# Patient Record
Sex: FEMALE | Race: BLACK OR AFRICAN AMERICAN | Employment: OTHER | URBAN - METROPOLITAN AREA
[De-identification: names, ages, dates, MRNs, and addresses within clinical notes are randomized per-mention and may not be internally consistent; named-entity substitution may affect disease eponyms.]

---

## 2018-04-06 ENCOUNTER — APPOINTMENT (OUTPATIENT)
Dept: CT IMAGING | Facility: CLINIC | Age: 63
DRG: 536 | End: 2018-04-06
Attending: EMERGENCY MEDICINE
Payer: COMMERCIAL

## 2018-04-06 ENCOUNTER — APPOINTMENT (OUTPATIENT)
Dept: GENERAL RADIOLOGY | Facility: CLINIC | Age: 63
DRG: 536 | End: 2018-04-06
Attending: EMERGENCY MEDICINE
Payer: COMMERCIAL

## 2018-04-06 ENCOUNTER — HOSPITAL ENCOUNTER (INPATIENT)
Facility: CLINIC | Age: 63
LOS: 3 days | Discharge: HOME HEALTH | DRG: 536 | End: 2018-04-10
Attending: EMERGENCY MEDICINE | Admitting: HOSPITALIST
Payer: COMMERCIAL

## 2018-04-06 DIAGNOSIS — R10.9 ABDOMINAL PAIN OF UNKNOWN ETIOLOGY: ICD-10-CM

## 2018-04-06 DIAGNOSIS — M54.9 INTRACTABLE BACK PAIN: ICD-10-CM

## 2018-04-06 DIAGNOSIS — S72.101A CLOSED FRACTURE OF TROCHANTER OF RIGHT FEMUR, INITIAL ENCOUNTER (H): Primary | ICD-10-CM

## 2018-04-06 DIAGNOSIS — M25.551 PAIN OF RIGHT HIP JOINT: ICD-10-CM

## 2018-04-06 DIAGNOSIS — M79.604 PAIN OF RIGHT LOWER EXTREMITY: ICD-10-CM

## 2018-04-06 DIAGNOSIS — R26.2 UNABLE TO AMBULATE: ICD-10-CM

## 2018-04-06 LAB
ANION GAP SERPL CALCULATED.3IONS-SCNC: 8 MMOL/L (ref 3–14)
BASOPHILS # BLD AUTO: 0 10E9/L (ref 0–0.2)
BASOPHILS NFR BLD AUTO: 0.2 %
BUN SERPL-MCNC: 22 MG/DL (ref 7–30)
CALCIUM SERPL-MCNC: 10.4 MG/DL (ref 8.5–10.1)
CHLORIDE SERPL-SCNC: 98 MMOL/L (ref 94–109)
CO2 SERPL-SCNC: 30 MMOL/L (ref 20–32)
CREAT SERPL-MCNC: 0.73 MG/DL (ref 0.52–1.04)
DIFFERENTIAL METHOD BLD: ABNORMAL
EOSINOPHIL # BLD AUTO: 0.1 10E9/L (ref 0–0.7)
EOSINOPHIL NFR BLD AUTO: 0.3 %
ERYTHROCYTE [DISTWIDTH] IN BLOOD BY AUTOMATED COUNT: 12.9 % (ref 10–15)
GFR SERPL CREATININE-BSD FRML MDRD: 80 ML/MIN/1.7M2
GLUCOSE SERPL-MCNC: 107 MG/DL (ref 70–99)
HCT VFR BLD AUTO: 37.2 % (ref 35–47)
HGB BLD-MCNC: 12.5 G/DL (ref 11.7–15.7)
IMM GRANULOCYTES # BLD: 0.1 10E9/L (ref 0–0.4)
IMM GRANULOCYTES NFR BLD: 0.7 %
LYMPHOCYTES # BLD AUTO: 1.8 10E9/L (ref 0.8–5.3)
LYMPHOCYTES NFR BLD AUTO: 10.5 %
MCH RBC QN AUTO: 31 PG (ref 26.5–33)
MCHC RBC AUTO-ENTMCNC: 33.6 G/DL (ref 31.5–36.5)
MCV RBC AUTO: 92 FL (ref 78–100)
MONOCYTES # BLD AUTO: 1.5 10E9/L (ref 0–1.3)
MONOCYTES NFR BLD AUTO: 9.1 %
NEUTROPHILS # BLD AUTO: 13.3 10E9/L (ref 1.6–8.3)
NEUTROPHILS NFR BLD AUTO: 79.2 %
NRBC # BLD AUTO: 0 10*3/UL
NRBC BLD AUTO-RTO: 0 /100
PLATELET # BLD AUTO: 338 10E9/L (ref 150–450)
POTASSIUM SERPL-SCNC: 3.3 MMOL/L (ref 3.4–5.3)
RBC # BLD AUTO: 4.03 10E12/L (ref 3.8–5.2)
SODIUM SERPL-SCNC: 136 MMOL/L (ref 133–144)
WBC # BLD AUTO: 16.8 10E9/L (ref 4–11)

## 2018-04-06 PROCEDURE — 85025 COMPLETE CBC W/AUTO DIFF WBC: CPT | Performed by: EMERGENCY MEDICINE

## 2018-04-06 PROCEDURE — 25000128 H RX IP 250 OP 636: Performed by: EMERGENCY MEDICINE

## 2018-04-06 PROCEDURE — 25000125 ZZHC RX 250: Performed by: EMERGENCY MEDICINE

## 2018-04-06 PROCEDURE — 99285 EMERGENCY DEPT VISIT HI MDM: CPT | Mod: 25

## 2018-04-06 PROCEDURE — 74177 CT ABD & PELVIS W/CONTRAST: CPT

## 2018-04-06 PROCEDURE — 72100 X-RAY EXAM L-S SPINE 2/3 VWS: CPT

## 2018-04-06 PROCEDURE — 96374 THER/PROPH/DIAG INJ IV PUSH: CPT | Mod: 59

## 2018-04-06 PROCEDURE — 73502 X-RAY EXAM HIP UNI 2-3 VIEWS: CPT

## 2018-04-06 PROCEDURE — 80048 BASIC METABOLIC PNL TOTAL CA: CPT | Performed by: EMERGENCY MEDICINE

## 2018-04-06 RX ORDER — MORPHINE SULFATE 4 MG/ML
4 INJECTION, SOLUTION INTRAMUSCULAR; INTRAVENOUS
Status: COMPLETED | OUTPATIENT
Start: 2018-04-06 | End: 2018-04-07

## 2018-04-06 RX ORDER — IOPAMIDOL 755 MG/ML
71 INJECTION, SOLUTION INTRAVASCULAR ONCE
Status: COMPLETED | OUTPATIENT
Start: 2018-04-06 | End: 2018-04-06

## 2018-04-06 RX ADMIN — MORPHINE SULFATE 4 MG: 4 INJECTION INTRAVENOUS at 22:31

## 2018-04-06 RX ADMIN — IOPAMIDOL 71 ML: 755 INJECTION, SOLUTION INTRAVENOUS at 23:45

## 2018-04-06 RX ADMIN — SODIUM CHLORIDE 61 ML: 9 INJECTION, SOLUTION INTRAVENOUS at 23:46

## 2018-04-06 ASSESSMENT — ENCOUNTER SYMPTOMS
UNEXPECTED WEIGHT CHANGE: 0
NUMBNESS: 1

## 2018-04-06 NOTE — IP AVS SNAPSHOT
[address] [facility]    [address] [address]    Phone:  [phone]                                       After Visit Summary   4/6/2018    Vamshi Murry    MRN: [id number]           After Visit Summary Signature Page     I have received my discharge instructions, and my questions have been answered. I have discussed any challenges I see with this plan with the nurse or doctor.    ..........................................................................................................................................  Patient/Patient Representative Signature      ..........................................................................................................................................  Patient Representative Print Name and Relationship to Patient    ..................................................               ................................................  Date                                            Time    ..........................................................................................................................................  Reviewed by Signature/Title    ...................................................              ..............................................  Date                                                            Time

## 2018-04-06 NOTE — IP AVS SNAPSHOT
MRN:[id number]                      After Visit Summary   4/6/2018    Vamshi Murry    MRN: [id number]           Thank you!     Thank you for choosing [facility] for your care. Our goal is always to provide you with excellent care. Hearing back from our patients is one way we can continue to improve our services. Please take a few minutes to complete the written survey that you may receive in the mail after you visit with us. Thank you!        Patient Information     Date Of Birth          1955        Designated Caregiver       Most Recent Value    Caregiver    Will someone help with your care after discharge? yes    Name of designated caregiver elijah zapata    Phone number of caregiver [phone]    Caregiver address [address] [address]      About your hospital stay     You were admitted on:  April 7, 2018 You last received care in the:  [place] [address] [facility]    You were discharged on:  April 10, 2018        Reason for your hospital stay       Right trochanteric fracture                  Who to Call     For medical emergencies, please call 911.  For non-urgent questions about your medical care, please call your primary care provider or clinic, [phone]          Attending Provider     Provider Specialty    Norma Castelan MD Emergency Medicine    [facility]    Kateryna Lopez MD Internal Medicine       Primary Care Provider Office Phone # Fax #    [facility] [phone]      After Care Instructions     Activity       Your activity upon discharge: activity as tolerated with walker.  Advance as recommended by PT            Diet       Follow this diet upon discharge: Orders Placed This Encounter      Combination Diet Regular Diet Adult; 2797-4526 Calories: Moderate Consistent CHO (4-6 CHO units/meal)                  Follow-up Appointments     Follow-up and recommended labs and tests         "Follow up with primary care provider, [facility], within 2 weeks, for hospital follow- up. No follow up labs or test are needed.    Should also follow-up with orthopedic surgery in 3-4 weeks                  Additional Services     Home Care OT Referral for Hospital Discharge       OT to eval and treat    Your provider has ordered home care - occupational therapy. If you have not been contacted within 2 days of your discharge please call the department phone number listed on the top of this document.            Home Care PT Referral for Hospital Discharge       PT to eval and treat    Your provider has ordered home care - physical therapy. If you have not been contacted within 2 days of your discharge please call the department phone number listed on the top of this document.                  Further instructions from your care team       Patient and son wish to consult with PCP before having HHC, they are aware that they will need to get HHC orders from Dr. Blunt, PCP.    Pending Results     Date and Time Order Name Status Description    4/7/2018 0510 Blood culture Preliminary     4/7/2018 0510 Blood culture Preliminary             Statement of Approval     Ordered          04/10/18 1005  I have reviewed and agree with all the recommendations and orders detailed in this document.  EFFECTIVE NOW     Approved and electronically signed by:  Steffen Walters,              Admission Information     Date & Time Provider Department Dept. Phone    4/6/2018 Kateryna Lopez MD [place] [address] [facility] [phone]      Your Vitals Were     Blood Pressure Pulse Temperature Respirations Height Weight    132/65 (BP Location: Left arm) 66 98.5  F (36.9  C) (Oral) 16 1.575 m (5' 2\") 59.2 kg (130 lb 8 oz)    Pulse Oximetry BMI (Body Mass Index)                94% 23.87 kg/m2          MyChart Information     [employer] lets you send messages to your doctor, view your test results, renew your " "prescriptions, schedule appointments and more. To sign up, go to [email or web address] . Click on \"Log in\" on the left side of the screen, which will take you to the Welcome page. Then click on \"Sign up Now\" on the right side of the page.     You will be asked to enter the access code listed below, as well as some personal information. Please follow the directions to create your username and password.     Your access code is: [email or web address]  Expires: 2018 11:56 AM     Your access code will  in 90 days. If you need help or a new code, please call your [facility] clinic or [phone].        Care EveryWhere ID     This is your Care EveryWhere ID. This could be used by other organizations to access your [facility] medical records  [id number]        Equal Access to Services     REESE BALDWIN : Bart Rushing, zeny garza, adolfo duque, madie houser . So [facility] [phone].    ATENCIÓN: Si habla español, tiene a veras disposición servicios gratuitos de asistencia lingüística. Niels al [phone].    We comply with applicable federal civil rights laws and [place] laws. We do not discriminate on the basis of race, color, national origin, age, disability, sex, sexual orientation, or gender identity.               Review of your medicines      START taking        Dose / Directions    order for DME   Used for:  Closed fracture of trochanter of right femur, initial encounter (H)        Equipment being ordered: Walker () Treatment Diagnosis: Trochanteric fracture   Quantity:  1 Device   Refills:  0       oxyCODONE-acetaminophen 5-325 MG per tablet   Commonly known as:  PERCOCET   Used for:  Closed fracture of trochanter of right femur, initial encounter (H)        Dose:  1 tablet   Take 1 tablet by mouth every 4 hours as needed for moderate to severe pain   Quantity:  14 tablet   Refills:  0       traMADol 100 MG 24 hr tablet   Commonly known as:  " ULTRAM-ER   Used for:  Closed fracture of trochanter of right femur, initial encounter (H)        Dose:  100 mg   Start taking on:  4/11/2018   Take 1 tablet (100 mg) by mouth daily   Quantity:  14 tablet   Refills:  0         CONTINUE these medicines which have NOT CHANGED        Dose / Directions    GABAPENTIN PO        Dose:  300 mg   Take 300 mg by mouth 2 times daily   Refills:  0       GLIMEPIRIDE PO        Dose:  2 mg   Take 2 mg by mouth daily (with breakfast)   Refills:  0       ketotifen 0.025 % Soln ophthalmic solution   Commonly known as:  ZADITOR/REFRESH ANTI-ITCH        Dose:  1 drop   Place 1 drop into both eyes daily as needed for itching   Refills:  0       LIPITOR PO        Dose:  40 mg   Take 40 mg by mouth At Bedtime   Refills:  0       losartan-hydrochlorothiazide 50-12.5 MG per tablet   Commonly known as:  HYZAAR        Dose:  1 tablet   Take 1 tablet by mouth daily   Refills:  0       METFORMIN HCL PO        Dose:  500 mg   Take 500 mg by mouth 2 times daily (with meals)   Refills:  0       MIRALAX PO        Dose:  17 g   Take 17 g by mouth daily as needed   Refills:  0       VITAMIN D (CHOLECALCIFEROL) PO        Dose:  2000 Units   Take 2,000 Units by mouth daily   Refills:  0       ZETIA PO        Dose:  10 mg   Take 10 mg by mouth At Bedtime   Refills:  0            Where to get your medicines      Some of these will need a paper prescription and others can be bought over the counter. Ask your nurse if you have questions.     Bring a paper prescription for each of these medications     order for DME    oxyCODONE-acetaminophen 5-325 MG per tablet    traMADol 100 MG 24 hr tablet                Protect others around you: Learn how to safely use, store and throw away your medicines at www.disposemymeds.org.        Information about OPIOIDS     PRESCRIPTION OPIOIDS: WHAT YOU NEED TO KNOW    Prescription opioids can be used to help relieve moderate to severe pain and are often prescribed  following a surgery or injury, or for certain health conditions. These medications can be an important part of treatment but also come with serious risks. It is important to work with your health care provider to make sure you are getting the safest, most effective care.    WHAT ARE THE RISKS AND SIDE EFFECTS OF OPIOID USE?  Prescription opioids carry serious risks of addiction and overdose, especially with prolonged use. An opioid overdose, often marked by slowed breathing can cause sudden death. The use of prescription opioids can have a number of side effects as well, even when taken as directed:      Tolerance - meaning you might need to take more of a medication for the same pain relief    Physical dependence - meaning you have symptoms of withdrawal when a medication is stopped    Increased sensitivity to pain    Constipation    Nausea, vomiting, and dry mouth    Sleepiness and dizziness    Confusion    Depression    Low levels of testosterone that can result in lower sex drive, energy, and strength    Itching and sweating    RISKS ARE GREATER WITH:    History of drug misuse, substance use disorder, or overdose    Mental health conditions (such as depression or anxiety)    Sleep apnea    Older age (65 years or older)    Pregnancy    Avoid alcohol while taking prescription opioids.   Also, unless specifically advised by your health care provider, medications to avoid include:    Benzodiazepines (such as Xanax or Valium)    Muscle relaxants (such as Soma or Flexeril)    Hypnotics (such as Ambien or Lunesta)    Other prescription opioids    KNOW YOUR OPTIONS:  Talk to your health care provider about ways to manage your pain that do not involve prescription opioids. Some of these options may actually work better and have fewer risks and side effects:    Pain relievers such as acetaminophen, ibuprofen, and naproxen    Some medications that are also used for depression or seizures    Physical therapy and  exercise    Cognitive behavioral therapy, a psychological, goal-directed approach, in which patients learn how to modify physical, behavioral, and emotional triggers of pain and stress    IF YOU ARE PRESCRIBED OPIOIDS FOR PAIN:    Never take opioids in greater amounts or more often than prescribed    Follow up with your primary health care provider and work together to create a plan on how to manage your pain.    Talk about ways to help manage your pain that do not involve prescription opioids    Talk about all concerns and side effects    Help prevent misuse and abuse    Never sell or share prescription opioids    Never use another person's prescription opioids    Store prescription opioids in a secure place and out of reach of others (this may include visitors, children, friends, and family)    Visit www.cdc.gov/drugoverdose to learn about risks of opioid abuse and overdose    If you believe you may be struggling with addiction, tell your health care provider and ask for guidance or call [facility]'s National Helpline at 3-288-311-HELP    LEARN MORE / www.cdc.gov/drugoverdose/prescribing/guideline.html    Safely dispose of unused prescription opioids: Find your local drug take-back programs and more information about the importance of safe disposal at www.doseofreality.[place].gov             Medication List: This is a list of all your medications and when to take them. Check marks below indicate your daily home schedule. Keep this list as a reference.      Medications           Morning Afternoon Evening Bedtime As Needed    GABAPENTIN PO   Take 300 mg by mouth 2 times daily   Last time this was given:  300 mg on 4/10/2018  8:23 AM   Next Dose Due:  04/10/18                                   GLIMEPIRIDE PO   Take 2 mg by mouth daily (with breakfast)   Next Dose Due:  Resume as ordered                                ketotifen 0.025 % Soln ophthalmic solution   Commonly known as:  ZADITOR/REFRESH ANTI-ITCH   Place 1 drop  into both eyes daily as needed for itching   Next Dose Due:  Resume as ordered                                 LIPITOR PO   Take 40 mg by mouth At Bedtime   Last time this was given:  40 mg on 4/9/2018 10:55 PM   Next Dose Due:  04/10/18                                   losartan-hydrochlorothiazide 50-12.5 MG per tablet   Commonly known as:  HYZAAR   Take 1 tablet by mouth daily   Last time this was given:  1 tablet on 4/10/2018  8:23 AM                                   METFORMIN HCL PO   Take 500 mg by mouth 2 times daily (with meals)   Next Dose Due:  Resume as ordered                                MIRALAX PO   Take 17 g by mouth daily as needed   Last time this was given:  17 g on 4/7/2018  5:12 PM   Next Dose Due:  As needed any time                                   order for DME   Equipment being ordered: Walker () Treatment Diagnosis: Trochanteric fracture                                oxyCODONE-acetaminophen 5-325 MG per tablet   Commonly known as:  PERCOCET   Take 1 tablet by mouth every 4 hours as needed for moderate to severe pain   Last time this was given:  1 tablet on 4/10/2018 10:04 AM   Next Dose Due:  As needed after 2 pm                                   traMADol 100 MG 24 hr tablet   Commonly known as:  ULTRAM-ER   Take 1 tablet (100 mg) by mouth daily   Start taking on:  4/11/2018   Last time this was given:  100 mg on 4/10/2018  8:23 AM   Next Dose Due:  04/11/18                                   VITAMIN D (CHOLECALCIFEROL) PO   Take 2,000 Units by mouth daily   Last time this was given:  2,000 Units on 4/10/2018  8:23 AM   Next Dose Due:  04/11/18                                   ZETIA PO   Take 10 mg by mouth At Bedtime   Last time this was given:  10 mg on 4/9/2018 10:56 PM   Next Dose Due:  04/10/18

## 2018-04-07 ENCOUNTER — APPOINTMENT (OUTPATIENT)
Dept: GENERAL RADIOLOGY | Facility: CLINIC | Age: 63
DRG: 536 | End: 2018-04-07
Attending: PHYSICIAN ASSISTANT
Payer: COMMERCIAL

## 2018-04-07 ENCOUNTER — APPOINTMENT (OUTPATIENT)
Dept: CT IMAGING | Facility: CLINIC | Age: 63
DRG: 536 | End: 2018-04-07
Attending: PHYSICIAN ASSISTANT
Payer: COMMERCIAL

## 2018-04-07 ENCOUNTER — APPOINTMENT (OUTPATIENT)
Dept: MRI IMAGING | Facility: CLINIC | Age: 63
DRG: 536 | End: 2018-04-07
Attending: PHYSICIAN ASSISTANT
Payer: COMMERCIAL

## 2018-04-07 ENCOUNTER — APPOINTMENT (OUTPATIENT)
Dept: ULTRASOUND IMAGING | Facility: CLINIC | Age: 63
DRG: 536 | End: 2018-04-07
Attending: PHYSICIAN ASSISTANT
Payer: COMMERCIAL

## 2018-04-07 PROBLEM — M54.9 BACK PAIN: Status: ACTIVE | Noted: 2018-04-07

## 2018-04-07 PROBLEM — M79.606 LEG PAIN: Status: ACTIVE | Noted: 2018-04-07

## 2018-04-07 LAB
ALBUMIN SERPL-MCNC: 3.5 G/DL (ref 3.4–5)
ALBUMIN UR-MCNC: NEGATIVE MG/DL
ALP SERPL-CCNC: 45 U/L (ref 40–150)
ALT SERPL W P-5'-P-CCNC: 16 U/L (ref 0–50)
ANION GAP SERPL CALCULATED.3IONS-SCNC: 6 MMOL/L (ref 3–14)
APPEARANCE UR: CLEAR
AST SERPL W P-5'-P-CCNC: 15 U/L (ref 0–45)
BASOPHILS # BLD AUTO: 0.1 10E9/L (ref 0–0.2)
BASOPHILS NFR BLD AUTO: 0.3 %
BILIRUB DIRECT SERPL-MCNC: 0.2 MG/DL (ref 0–0.2)
BILIRUB SERPL-MCNC: 1 MG/DL (ref 0.2–1.3)
BILIRUB UR QL STRIP: NEGATIVE
BUN SERPL-MCNC: 19 MG/DL (ref 7–30)
CA-I SERPL ISE-MCNC: 5.2 MG/DL (ref 4.4–5.2)
CALCIUM SERPL-MCNC: 9.7 MG/DL (ref 8.5–10.1)
CHLORIDE SERPL-SCNC: 98 MMOL/L (ref 94–109)
CO2 SERPL-SCNC: 28 MMOL/L (ref 20–32)
COLOR UR AUTO: ABNORMAL
CREAT SERPL-MCNC: 0.78 MG/DL (ref 0.52–1.04)
CRP SERPL-MCNC: 39.6 MG/L (ref 0–8)
DIFFERENTIAL METHOD BLD: ABNORMAL
EOSINOPHIL # BLD AUTO: 0 10E9/L (ref 0–0.7)
EOSINOPHIL NFR BLD AUTO: 0.1 %
ERYTHROCYTE [DISTWIDTH] IN BLOOD BY AUTOMATED COUNT: 13 % (ref 10–15)
ERYTHROCYTE [SEDIMENTATION RATE] IN BLOOD BY WESTERGREN METHOD: 16 MM/H (ref 0–30)
FLUAV+FLUBV RNA SPEC QL NAA+PROBE: NEGATIVE
FLUAV+FLUBV RNA SPEC QL NAA+PROBE: NEGATIVE
GFR SERPL CREATININE-BSD FRML MDRD: 75 ML/MIN/1.7M2
GLUCOSE BLDC GLUCOMTR-MCNC: 165 MG/DL (ref 70–99)
GLUCOSE BLDC GLUCOMTR-MCNC: 165 MG/DL (ref 70–99)
GLUCOSE BLDC GLUCOMTR-MCNC: 182 MG/DL (ref 70–99)
GLUCOSE BLDC GLUCOMTR-MCNC: 194 MG/DL (ref 70–99)
GLUCOSE BLDC GLUCOMTR-MCNC: 245 MG/DL (ref 70–99)
GLUCOSE SERPL-MCNC: 219 MG/DL (ref 70–99)
GLUCOSE UR STRIP-MCNC: NEGATIVE MG/DL
HBA1C MFR BLD: 7.1 % (ref 0–6.4)
HCT VFR BLD AUTO: 34.1 % (ref 35–47)
HGB BLD-MCNC: 11.3 G/DL (ref 11.7–15.7)
HGB UR QL STRIP: ABNORMAL
IMM GRANULOCYTES # BLD: 0 10E9/L (ref 0–0.4)
IMM GRANULOCYTES NFR BLD: 0.3 %
KETONES UR STRIP-MCNC: NEGATIVE MG/DL
LEUKOCYTE ESTERASE UR QL STRIP: ABNORMAL
LYMPHOCYTES # BLD AUTO: 2.4 10E9/L (ref 0.8–5.3)
LYMPHOCYTES NFR BLD AUTO: 15.9 %
MCH RBC QN AUTO: 30.5 PG (ref 26.5–33)
MCHC RBC AUTO-ENTMCNC: 33.1 G/DL (ref 31.5–36.5)
MCV RBC AUTO: 92 FL (ref 78–100)
MONOCYTES # BLD AUTO: 1.7 10E9/L (ref 0–1.3)
MONOCYTES NFR BLD AUTO: 11.1 %
NEUTROPHILS # BLD AUTO: 11.1 10E9/L (ref 1.6–8.3)
NEUTROPHILS NFR BLD AUTO: 72.3 %
NITRATE UR QL: NEGATIVE
NRBC # BLD AUTO: 0 10*3/UL
NRBC BLD AUTO-RTO: 0 /100
PH UR STRIP: 5 PH (ref 5–7)
PLATELET # BLD AUTO: 308 10E9/L (ref 150–450)
POTASSIUM SERPL-SCNC: 3.9 MMOL/L (ref 3.4–5.3)
PROCALCITONIN SERPL-MCNC: 0.18 NG/ML
PROT SERPL-MCNC: 6.7 G/DL (ref 6.8–8.8)
RADIOLOGIST FLAGS: ABNORMAL
RBC # BLD AUTO: 3.7 10E12/L (ref 3.8–5.2)
RBC #/AREA URNS AUTO: 1 /HPF (ref 0–2)
RSV RNA SPEC NAA+PROBE: NEGATIVE
SODIUM SERPL-SCNC: 132 MMOL/L (ref 133–144)
SOURCE: ABNORMAL
SP GR UR STRIP: 1.02 (ref 1–1.03)
SPECIMEN SOURCE: NORMAL
SQUAMOUS #/AREA URNS AUTO: 3 /HPF (ref 0–1)
UROBILINOGEN UR STRIP-MCNC: NORMAL MG/DL (ref 0–2)
WBC # BLD AUTO: 15.4 10E9/L (ref 4–11)
WBC #/AREA URNS AUTO: 4 /HPF (ref 0–5)

## 2018-04-07 PROCEDURE — 81001 URINALYSIS AUTO W/SCOPE: CPT | Performed by: EMERGENCY MEDICINE

## 2018-04-07 PROCEDURE — 25000132 ZZH RX MED GY IP 250 OP 250 PS 637: Performed by: INTERNAL MEDICINE

## 2018-04-07 PROCEDURE — 73720 MRI LWR EXTREMITY W/O&W/DYE: CPT | Mod: RT

## 2018-04-07 PROCEDURE — 87040 BLOOD CULTURE FOR BACTERIA: CPT | Performed by: HOSPITALIST

## 2018-04-07 PROCEDURE — 00000146 ZZHCL STATISTIC GLUCOSE BY METER IP

## 2018-04-07 PROCEDURE — 96376 TX/PRO/DX INJ SAME DRUG ADON: CPT

## 2018-04-07 PROCEDURE — G0378 HOSPITAL OBSERVATION PER HR: HCPCS

## 2018-04-07 PROCEDURE — 25000128 H RX IP 250 OP 636: Performed by: HOSPITALIST

## 2018-04-07 PROCEDURE — A9585 GADOBUTROL INJECTION: HCPCS | Performed by: HOSPITALIST

## 2018-04-07 PROCEDURE — 82330 ASSAY OF CALCIUM: CPT | Performed by: HOSPITALIST

## 2018-04-07 PROCEDURE — 93970 EXTREMITY STUDY: CPT | Mod: XS

## 2018-04-07 PROCEDURE — 71046 X-RAY EXAM CHEST 2 VIEWS: CPT

## 2018-04-07 PROCEDURE — 85025 COMPLETE CBC W/AUTO DIFF WBC: CPT | Performed by: HOSPITALIST

## 2018-04-07 PROCEDURE — 36415 COLL VENOUS BLD VENIPUNCTURE: CPT | Performed by: PHYSICIAN ASSISTANT

## 2018-04-07 PROCEDURE — 99223 1ST HOSP IP/OBS HIGH 75: CPT | Mod: AI | Performed by: HOSPITALIST

## 2018-04-07 PROCEDURE — 85652 RBC SED RATE AUTOMATED: CPT | Performed by: PHYSICIAN ASSISTANT

## 2018-04-07 PROCEDURE — 83036 HEMOGLOBIN GLYCOSYLATED A1C: CPT | Performed by: HOSPITALIST

## 2018-04-07 PROCEDURE — 80076 HEPATIC FUNCTION PANEL: CPT | Performed by: HOSPITALIST

## 2018-04-07 PROCEDURE — 25000128 H RX IP 250 OP 636: Performed by: EMERGENCY MEDICINE

## 2018-04-07 PROCEDURE — 86140 C-REACTIVE PROTEIN: CPT | Performed by: PHYSICIAN ASSISTANT

## 2018-04-07 PROCEDURE — 12000007 ZZH R&B INTERMEDIATE

## 2018-04-07 PROCEDURE — 87631 RESP VIRUS 3-5 TARGETS: CPT | Performed by: HOSPITALIST

## 2018-04-07 PROCEDURE — 25000131 ZZH RX MED GY IP 250 OP 636 PS 637: Performed by: HOSPITALIST

## 2018-04-07 PROCEDURE — 87086 URINE CULTURE/COLONY COUNT: CPT | Performed by: EMERGENCY MEDICINE

## 2018-04-07 PROCEDURE — 71250 CT THORAX DX C-: CPT

## 2018-04-07 PROCEDURE — 25000132 ZZH RX MED GY IP 250 OP 250 PS 637: Performed by: HOSPITALIST

## 2018-04-07 PROCEDURE — 80048 BASIC METABOLIC PNL TOTAL CA: CPT | Performed by: PHYSICIAN ASSISTANT

## 2018-04-07 PROCEDURE — 36415 COLL VENOUS BLD VENIPUNCTURE: CPT | Performed by: HOSPITALIST

## 2018-04-07 PROCEDURE — 84145 PROCALCITONIN (PCT): CPT | Performed by: PHYSICIAN ASSISTANT

## 2018-04-07 PROCEDURE — 93970 EXTREMITY STUDY: CPT

## 2018-04-07 PROCEDURE — 96372 THER/PROPH/DIAG INJ SC/IM: CPT

## 2018-04-07 RX ORDER — ACETAMINOPHEN 650 MG/1
650 SUPPOSITORY RECTAL EVERY 4 HOURS PRN
Status: DISCONTINUED | OUTPATIENT
Start: 2018-04-07 | End: 2018-04-10 | Stop reason: HOSPADM

## 2018-04-07 RX ORDER — NICOTINE POLACRILEX 4 MG
15-30 LOZENGE BUCCAL
Status: DISCONTINUED | OUTPATIENT
Start: 2018-04-07 | End: 2018-04-10 | Stop reason: HOSPADM

## 2018-04-07 RX ORDER — NALOXONE HYDROCHLORIDE 0.4 MG/ML
.1-.4 INJECTION, SOLUTION INTRAMUSCULAR; INTRAVENOUS; SUBCUTANEOUS
Status: DISCONTINUED | OUTPATIENT
Start: 2018-04-07 | End: 2018-04-10 | Stop reason: HOSPADM

## 2018-04-07 RX ORDER — SODIUM CHLORIDE 9 MG/ML
INJECTION, SOLUTION INTRAVENOUS CONTINUOUS
Status: DISCONTINUED | OUTPATIENT
Start: 2018-04-07 | End: 2018-04-08

## 2018-04-07 RX ORDER — AMOXICILLIN 250 MG
2 CAPSULE ORAL 2 TIMES DAILY PRN
Status: DISCONTINUED | OUTPATIENT
Start: 2018-04-07 | End: 2018-04-10 | Stop reason: HOSPADM

## 2018-04-07 RX ORDER — GADOBUTROL 604.72 MG/ML
6 INJECTION INTRAVENOUS ONCE
Status: COMPLETED | OUTPATIENT
Start: 2018-04-07 | End: 2018-04-07

## 2018-04-07 RX ORDER — POLYETHYLENE GLYCOL 3350 17 G/17G
17 POWDER, FOR SOLUTION ORAL DAILY PRN
Status: DISCONTINUED | OUTPATIENT
Start: 2018-04-07 | End: 2018-04-07

## 2018-04-07 RX ORDER — POLYETHYLENE GLYCOL 3350 17 G/17G
17 POWDER, FOR SOLUTION ORAL ONCE
Status: COMPLETED | OUTPATIENT
Start: 2018-04-07 | End: 2018-04-07

## 2018-04-07 RX ORDER — HYDRALAZINE HYDROCHLORIDE 20 MG/ML
10-20 INJECTION INTRAMUSCULAR; INTRAVENOUS EVERY 4 HOURS PRN
Status: DISCONTINUED | OUTPATIENT
Start: 2018-04-07 | End: 2018-04-10 | Stop reason: HOSPADM

## 2018-04-07 RX ORDER — ONDANSETRON 4 MG/1
4 TABLET, ORALLY DISINTEGRATING ORAL EVERY 6 HOURS PRN
Status: DISCONTINUED | OUTPATIENT
Start: 2018-04-07 | End: 2018-04-10 | Stop reason: HOSPADM

## 2018-04-07 RX ORDER — DEXTROSE MONOHYDRATE 25 G/50ML
25-50 INJECTION, SOLUTION INTRAVENOUS
Status: DISCONTINUED | OUTPATIENT
Start: 2018-04-07 | End: 2018-04-10 | Stop reason: HOSPADM

## 2018-04-07 RX ORDER — ACETAMINOPHEN 325 MG/1
650 TABLET ORAL EVERY 4 HOURS PRN
Status: DISCONTINUED | OUTPATIENT
Start: 2018-04-07 | End: 2018-04-10 | Stop reason: HOSPADM

## 2018-04-07 RX ORDER — LOSARTAN POTASSIUM AND HYDROCHLOROTHIAZIDE 12.5; 5 MG/1; MG/1
1 TABLET ORAL DAILY
COMMUNITY

## 2018-04-07 RX ORDER — ONDANSETRON 2 MG/ML
4 INJECTION INTRAMUSCULAR; INTRAVENOUS EVERY 6 HOURS PRN
Status: DISCONTINUED | OUTPATIENT
Start: 2018-04-07 | End: 2018-04-10 | Stop reason: HOSPADM

## 2018-04-07 RX ORDER — LIDOCAINE 40 MG/G
CREAM TOPICAL
Status: DISCONTINUED | OUTPATIENT
Start: 2018-04-07 | End: 2018-04-10 | Stop reason: HOSPADM

## 2018-04-07 RX ORDER — LANOLIN ALCOHOL/MO/W.PET/CERES
3 CREAM (GRAM) TOPICAL
Status: DISCONTINUED | OUTPATIENT
Start: 2018-04-07 | End: 2018-04-10 | Stop reason: HOSPADM

## 2018-04-07 RX ORDER — BISACODYL 10 MG
10 SUPPOSITORY, RECTAL RECTAL DAILY PRN
Status: DISCONTINUED | OUTPATIENT
Start: 2018-04-07 | End: 2018-04-09

## 2018-04-07 RX ORDER — POTASSIUM CHLORIDE 1.5 G/1.58G
20 POWDER, FOR SOLUTION ORAL ONCE
Status: COMPLETED | OUTPATIENT
Start: 2018-04-07 | End: 2018-04-07

## 2018-04-07 RX ORDER — AMOXICILLIN 250 MG
1 CAPSULE ORAL 2 TIMES DAILY PRN
Status: DISCONTINUED | OUTPATIENT
Start: 2018-04-07 | End: 2018-04-10 | Stop reason: HOSPADM

## 2018-04-07 RX ORDER — HYDROMORPHONE HYDROCHLORIDE 1 MG/ML
.2-.3 INJECTION, SOLUTION INTRAMUSCULAR; INTRAVENOUS; SUBCUTANEOUS
Status: DISCONTINUED | OUTPATIENT
Start: 2018-04-07 | End: 2018-04-09

## 2018-04-07 RX ORDER — OXYCODONE HYDROCHLORIDE 5 MG/1
5-10 TABLET ORAL
Status: DISCONTINUED | OUTPATIENT
Start: 2018-04-07 | End: 2018-04-08

## 2018-04-07 RX ADMIN — SODIUM CHLORIDE: 9 INJECTION, SOLUTION INTRAVENOUS at 18:01

## 2018-04-07 RX ADMIN — INSULIN ASPART 1 UNITS: 100 INJECTION, SOLUTION INTRAVENOUS; SUBCUTANEOUS at 10:25

## 2018-04-07 RX ADMIN — OXYCODONE HYDROCHLORIDE 10 MG: 5 TABLET ORAL at 10:36

## 2018-04-07 RX ADMIN — ACETAMINOPHEN 650 MG: 325 TABLET, FILM COATED ORAL at 17:49

## 2018-04-07 RX ADMIN — ACETAMINOPHEN 650 MG: 325 TABLET, FILM COATED ORAL at 08:37

## 2018-04-07 RX ADMIN — OXYCODONE HYDROCHLORIDE 5 MG: 5 TABLET ORAL at 05:54

## 2018-04-07 RX ADMIN — OXYCODONE HYDROCHLORIDE 10 MG: 5 TABLET ORAL at 21:21

## 2018-04-07 RX ADMIN — MORPHINE SULFATE 4 MG: 4 INJECTION INTRAVENOUS at 02:37

## 2018-04-07 RX ADMIN — OXYCODONE HYDROCHLORIDE 10 MG: 5 TABLET ORAL at 17:49

## 2018-04-07 RX ADMIN — POTASSIUM CHLORIDE 20 MEQ: 1.5 POWDER, FOR SOLUTION ORAL at 05:55

## 2018-04-07 RX ADMIN — INSULIN ASPART 2 UNITS: 100 INJECTION, SOLUTION INTRAVENOUS; SUBCUTANEOUS at 14:30

## 2018-04-07 RX ADMIN — SODIUM CHLORIDE: 9 INJECTION, SOLUTION INTRAVENOUS at 05:55

## 2018-04-07 RX ADMIN — OXYCODONE HYDROCHLORIDE 10 MG: 5 TABLET ORAL at 15:00

## 2018-04-07 RX ADMIN — MORPHINE SULFATE 4 MG: 4 INJECTION INTRAVENOUS at 00:37

## 2018-04-07 RX ADMIN — POLYETHYLENE GLYCOL 3350 17 G: 17 POWDER, FOR SOLUTION ORAL at 17:12

## 2018-04-07 RX ADMIN — INSULIN ASPART 1 UNITS: 100 INJECTION, SOLUTION INTRAVENOUS; SUBCUTANEOUS at 17:50

## 2018-04-07 RX ADMIN — GADOBUTROL 6 ML: 604.72 INJECTION INTRAVENOUS at 12:49

## 2018-04-07 NOTE — ED NOTES
Bed: ED04  Expected date:   Expected time:   Means of arrival:   Comments:   63f abd pain right lower extremity pain. [place] eta 4180

## 2018-04-07 NOTE — ED PROVIDER NOTES
History     Chief Complaint:  Leg Pain    The history is provided by the patient and a relative. The history is limited by a language barrier. No  was used (The patient's daughter and son served as her .).      Vamshi Muryr is a 63 year old female who presents to the emergency department today for evaluation of leg pain. The patient has been experiencing constant right hip pain that radiates down her right leg for the past 3 days. The pain is worse with movement and with palpation. This morning, she had difficulty walking due to the hip and leg pain and had lost her balance while walking to the restroom. Her son was able to catch her so she did not suffer a traumatic fall. She has numbness around her right knee and occasional urinary incontinence, but no recent weight loss or concerning new symptoms. She complains of occasional low back pain and intermittent abdominal pain, but states these symptoms have been present for a long time. Today, her main concern is her right hip and right leg pain. She reports no recent injuries to her hip or leg.     Allergies:  Drug allergies reviewed. No pertinent drug allergies.     Medications:    GABAPENTIN PO  VITAMIN D, CHOLECALCIFEROL, PO  METFORMIN HCL PO  GLIMEPIRIDE PO  losartan (COZAAR) 2.5 mg/mL SUSP  SIMVASTATIN PO    Past Medical History:    Diabetes   Hypertension     Past Surgical History:    History reviewed. No pertinent surgical history.    Family History:    Family history reviewed. No pertinent family history.     Social History:  The patient was accompanied to the ED by family.  Smoking Status: Never Smoker  Smokeless Tobacco: Never Used  Alcohol Use: Negative     Review of Systems   Constitutional: Negative for unexpected weight change.   Musculoskeletal:        Right hip and right leg pain   Neurological: Positive for numbness.   All other systems reviewed and are negative.    Physical Exam     Patient Vitals for the past 24  "hrs:   BP Temp Temp src Heart Rate Resp SpO2 Height Weight   04/07/18 0245 - - - - - 94 % - -   04/07/18 0240 - - - - - 96 % - -   04/06/18 2330 134/55 - - - - 97 % - -   04/06/18 2309 - - - - - 95 % - -   04/06/18 2308 141/66 - - - - - - -   04/06/18 2235 - - - - - 95 % - -   04/06/18 2202 (!) 127/105 98.3  F (36.8  C) Oral 78 20 98 % 1.575 m (5' 2\") 63.5 kg (140 lb)      Physical Exam  General: Well-nourished  Eyes: PERRL, conjunctivae pink no scleral icterus or conjunctival injection  ENT:  Moist mucus membranes, posterior oropharynx clear without erythema or exudates  Respiratory:  Lungs clear to auscultation bilaterally, no crackles/rubs/wheezes.  Good air movement  CV: Normal rate and rhythm, no murmurs/rubs/gallops  GI:  Abdomen soft and non-distended.  Normoactive BS.  Diffuse lower abdominal tenderness, no guarding or rebound  Skin: Warm, dry.  No rashes or petechiae  Musculoskeletal: No peripheral edema or calf tenderness.  Normal symmetric DP pulse, cap refill, temp, color distal foot.  Tender over right hip and femur, pain with movement at hip.  No appreciable effusion at hip/knee/ankle.  No redness or warmth.  No zoster rash.  +tender over right sciatic notch and diffusely across midline lumbar spine.    Neuro: Alert and oriented to person/place/time.  Pain with movement at right hip but able to actively range at hip/knee/ankle. Normal saddle sensation.  Normal and symmetric reflexes at patella tendon bilaterally.  Normal and symmetric strength at BLE.  Psychiatric: Normal affect    Emergency Department Course     Imaging:  Radiology findings were communicated with the patient and family who voiced understanding of the findings.    CT Abdomen Pelvis w Contrast  IMPRESSION:  1. No cause of acute pain identified in the abdomen or pelvis.  2. Colonic diverticulosis without evidence of diverticulitis.  Reading per radiology     XR Lumbar Spine 2/3 Views  IMPRESSION:  1. No visualized acute fracture, " malalignment or other acute osseous  abnormality of the lumbar spine.  2. Mild to moderate degenerative changes within the lumbar spine.  3. Atherosclerotic calcification in the abdominal aorta.  Reading per radiology     XR Pelvis w Hip Right 1 View  IMPRESSION:  1. No visualized acute fracture, malalignment or other acute osseous  abnormality of the pelvis or right hip.  2. Diffuse osteopenia.  Reading per radiology     Laboratory:  Laboratory findings were communicated with the patient and family who voiced understanding of the findings.    UA with Microscopic: Blood Trace (A), Leukocyte Esterase Moderate (A), Squamous Epithelial/HPF 3 (H) o/w WNL  Urine culture aerobic bacterial: Pending   CBC: WBC 16.8 (H), HGB 12.5,   BMP: Potassium 3.3 (L), Glucose (Collected 2033) 107 (H), Calcium 10.4 (H) o/w WNL (Creatinine 0.73)    Interventions:  2231 Morphine 4 mg IV   0037 Morphine 4 mg IV   0237 Morphine 4 mg IV     Emergency Department Course:    Nursing notes and vitals reviewed.    2207 I performed an exam of the patient as documented above.     IV was inserted and blood was drawn for laboratory testing, results above.      The patient was sent for a CT abdomen pelvis w contrast, x-ray pelvis w right hip, x-ray lumbar spine while in the emergency department, results above.       The patient provided a urine sample here in the emergency department. This was sent for laboratory testing, findings above.     2309 I updated the patient and family.    1320 I reassessed the patient.    0330 I discussed the patient with Dr. Hogan, who will admit the patient to a monitored bed for further evaluation and treatment.     I personally reviewed the lab and imaging results with the patient and answered all related questions prior to admission. I discussed the treatment plan with the patient. They expressed understanding of this plan and consented to admission.    Impression & Plan      Medical Decision Making:  Vamshi  Masood Murry is a 63 year old female who presents to the emergency department today for evaluation of right hip and leg pain as well as lower abdominal pain. Per the records, she has a history of chronic pelvic pain, but given elevated white blood cell count and her age, CT scan was obtained to rule out an acute intraabdominal process. CT scan was negative. She does not appear to have any evidence of a definite urinary tract infection and denied urinary symptoms.  X-rays of her lumbar spine, hip, and pelvis were obtained and are normal. Records do show she has a history of chronic pain that is typically on the left side. This right sided pain seems to be new to her. She does have a history of hyperparathyroidism and calcium is slightly elevated.  Pain may be related to this.  She is able to move it although she is unable to bear weight secondary to the pain. The pain was improved with parenteral narcotics, but she required 3 doses of morphine, and even after this she was unable to bear weight or ambulate on it with a walker. Given the possibility of occult fracture and the need for PT/OT and ongoing pain control, we will admit her to the hospitalist service on the observation unit. Dr. Hogan was gracious enough to accept her to his service and admit her. The family was in agreement with this plan.     Diagnosis:    ICD-10-CM    1. Intractable back pain M54.9 Urine Culture Aerobic Bacterial   2. Pain of right hip joint M25.551    3. Pain of right lower extremity M79.604    4. Abdominal pain of unknown etiology R10.9    5. Unable to ambulate R26.2      Disposition:   The patient is admitted into the care of Dr. Hogan.     Scribe Disclosure:  Autumn JOHNSON, am serving as a scribe at 10:11 PM on 4/6/2018 to document services personally performed by Norma Castelan MD, based on my observations and the provider's statements to me.       EMERGENCY DEPARTMENT       Norma Castelan MD  04/07/18 0712       Norma Castelan,  MD  04/07/18 0714

## 2018-04-07 NOTE — PLAN OF CARE
Problem: Patient Care Overview  Goal: Plan of Care/Patient Progress Review  PRIMARY DIAGNOSIS: ACUTE PAIN  OUTPATIENT/OBSERVATION GOALS TO BE MET BEFORE DISCHARGE:  1. Pain Status: Improved-controlled with oral pain medications.    2. Return to near baseline physical activity: No    3. Cleared for discharge by consultants (if involved): No; Pt. Currently off of unit for chest CT and MRI.    Discharge Planner Nurse   Safe discharge environment identified: Yes  Barriers to discharge: Yes; pt. Yet to meet OBS goals       Entered by: Hayley Jimenez 04/07/2018 12:39 PM     Please review provider order for any additional goals.   Nurse to notify provider when observation goals have been met and patient is ready for discharge.

## 2018-04-07 NOTE — ED NOTES
[facility]  ED Nurse Handoff Report    ED Chief complaint: Leg Pain (Started yesterday, mostly in right hip but radiates down her whole leg. This morning, may have caused her to lose her balance and fall- son was there to catch her and prevent tramatic falll. Unable to ambulate)      ED Diagnosis:   Final diagnoses:   Intractable back pain   Pain of right hip joint   Pain of right lower extremity   Abdominal pain of unknown etiology   Unable to ambulate       Code Status: Not on file    Allergies: No Known Allergies    Activity level - Baseline/Home:  Independent    Activity Level - Current:   Stand with Assist     Needed?: Yes; pt has consented to translate for patient, but if family is not present will need     Isolation: No  Infection: Not Applicable    Bariatric?: No    Vital Signs:   Vitals:    04/06/18 2308 04/06/18 2309 04/06/18 2330 04/07/18 0240   BP: 141/66  134/55    Resp:       Temp:       TempSrc:       SpO2:  95% 97% 96%   Weight:       Height:           Cardiac Rhythm: ,        Pain level: 0-10 Pain Scale: 9    Is this patient confused?: No     Patient Report: Initial Complaint: Unable to ambulate, normally independent, today leg pain from hip to knee that causes pain.   Focused Assessment: Pain rated 9/10, unable to walk during road tests. Morphine given for pain, bed pan for comfort but patient could tolerate bedside commode with assist.   Tests Performed: Xray, CT, labs  Abnormal Results: see results tab  Treatments provided: Pain medication, road test    Family Comments: family present     OBS brochure/video discussed/provided to patient: Yes    ED Medications:   Medications   morphine (PF) injection 4 mg (4 mg Intravenous Given 4/7/18 3897)   iopamidol (ISOVUE-370) solution 71 mL (71 mLs Intravenous Given 4/6/18 3061)   Saline flush (61 mLs Intravenous Given 4/6/18 2096)       Drips infusing?:  No    For the majority of the shift this patient was Green.    Interventions performed were n/a.    Severe Sepsis OR Septic Shock Diagnosis Present: No      ED NURSE PHONE NUMBER: [phone]       RECEIVING UNIT ED HANDOFF REVIEW    ED Nurse Handoff Report was reviewed by: Shivani Rodriguez on April 7, 2018 at 4:03 AM

## 2018-04-07 NOTE — PROGRESS NOTES
"[facility]  Hospitalist Progress Note  Admission Date:  4/6/2018       Assessment and Plan:   Vamshi Murry is a pleasant 63 year old woman with Type 2 Diabetes mellitus, hypertension, dyslipidemia, hyperparathyroid, and fibromyalgia who presents with progressive right leg pain and fever.    Noted her case is difficult given language barrier (somonlian speaking and declines interpretor) and incongruent care (per Care Everywhere she goes to [facility], , [facility], etc).  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     Also, there is mention in care Everywhere of \"lytic bone lesion in right femur, noted incidentally in 2014\". No further records on this available, no imaging in Care Everywhere. Reportedly, she just had an MRI of her spine as an outpatient that is not in Care Everywhere.    #Progressive RLE Pain  #Prior mention of lytic right femur lesion?  #Inability to ambulate   #Falls (one a month ago, one yesterday morning)   -concern for underlying malignancy or occult infection vs occult pelvic fracture   -MRI of right LE pending but per sign out there is surrounding fluid/inflammation around the right hip   PLAN:  -MRI of the RLE official read pending ---> depending what this shows I would consider obtaining pelvic CT to r/o occult fracture   -consult orthopedics and prepare for potential arthrocentesis if fluid is significant enough to tap   -Tylenol, Oxycodone, IV Dilaudid as needed for pain  -P.T/OT to follow      #Low Grade Fever  (Wsfe=977.4)   #Leukocytosis, WBC ~ 15   #Elevated Inflammatory Markers  -likely 2/2 orthopedic issue above vs malignancy vs infection (?)  -CRP markedly elevated   -procalcitonin elevated at 0.15   -WBC is 15-16; Care Everywhere labs seem to suggest that she has a chronic intermittent high white count.   PLAN:  -hold on abx for now, follow fever " curve, cont IVF/hydratoin and follow blood cx which are NGTD  -CBC in the am     #Abnormal CXR/Chest CT --> atelectasis vs pna   -CXR/Chest CT more indicative of atelectasis not pna   -not having any respiratory sx   PLAN:  -hold on abx and follow for now and monitor for sx     #Hypercalcemia, mild, likely due to parathyroid.  #Hypnatremia   #Hypokalemia: replacing  -electrolytes all improved now  PLAN:  -cont IVF of NS  -electrolyte replacement protocol for K/Mg as needed  -follow BMP      #Type 2 Diabetes mellitus: Cont ISS insulin     #Hypertension, dyslipidemia: hold PTA meds, BP on the lower side, avoid diuretics   -cont prn hydralazine     #DVT px-PCDs    #Dispo:  Admit to inpatient, transfer to floor today (likely ortho floor)       Shivani Villavicencio PA-C ([facility])   Hospitalist  Pager: [phone]  10:09 AM  April 7, 2018                  Interval History:   Most of the hx provided by gloria, [place] interpretor offered by they declined   Per son, patient had a fall about a month ago and then one yesterday am and after that fall she stopped walking and could no longer ambulate due to the pain  Currently still having significant pain and unable to ambulate with nursing or therapy   No cough, sputum, SOB or respiratory complaints what so ever               Medications:       sodium chloride (PF)  3 mL Intracatheter Q8H     insulin aspart  1-7 Units Subcutaneous TID AC     insulin aspart  1-5 Units Subcutaneous At Bedtime     acetaminophen, acetaminophen, naloxone, melatonin, ondansetron **OR** ondansetron, lidocaine (buffered or not buffered), lidocaine 4%, sodium chloride (PF), oxyCODONE IR, HYDROmorphone, senna-docusate **OR** senna-docusate, polyethylene glycol, bisacodyl, glucose **OR** dextrose **OR** glucagon               Physical Exam:   Patient Vitals for the past 24 hrs:   BP Temp Temp src Heart Rate Resp SpO2 Height Weight   04/07/18 0813 140/62 100.1  F (37.8  C) Oral 84 16 96 % - -   04/07/18 0435  "(!)  100.4  F (38  C) - 71 18 - 1.575 m (5' 2\") 59.6 kg (131 lb 6.3 oz)   18 0245 - - - - - 94 % - -   18 0240 - - - - - 96 % - -   18 2330 134/55 - - - - 97 % - -   18 2309 - - - - - 95 % - -   18 2308 141/66 - - - - - - -   18 2235 - - - - - 95 % - -   18 2202 (!) 127/105 98.3  F (36.8  C) Oral 78 20 98 % 1.575 m (5' 2\") 63.5 kg (140 lb)     Wt Readings from Last 4 Encounters:   18 59.6 kg (131 lb 6.3 oz)         Vital Sign Ranges  Temperature Temp  Av.6  F (37.6  C)  Min: 98.3  F (36.8  C)  Max: 100.4  F (38  C)   Blood pressure Systolic (24hrs), Av , Min:107 , Max:141        Diastolic (24hrs), Av, Min:31, Max:105      Pulse No Data Recorded   Respirations Resp  Av  Min: 16  Max: 20   Pulse oximetry SpO2  Av.9 %  Min: 94 %  Max: 98 %         Intake/Output Summary (Last 24 hours) at 18 1005  Last data filed at 18 0637   Gross per 24 hour   Intake               70 ml   Output                0 ml   Net               70 ml       Constitutional:   awake, alert, cooperative, no apparent distress, and appears stated age     Neck:   Supple, symmetrical, trachea midline, no adenopathy, thyroid symmetric, not enlarged and no tenderness, skin normal     Lungs:   No increased work of breathing, good air exchange, clear to auscultation bilaterally, no crackles or wheezing     Cardiovascular:   regular rate and rhythm, normal S1 and S2, no S3 or S4, and no murmur noted        Extremities  No edema, cyanosis or clubbing              Data:     Recent Labs   Lab Test  18   0635  18   WBC  15.4*  16.8*   HGB  11.3*  12.5   MCV  92  92   PLT  308  338      Recent Labs   Lab Test  18   NA  136   POTASSIUM  3.3*   CHLORIDE  98   CO2  30   BUN  22   CR  0.73   ANIONGAP  8   FARIBA  10.4*   GLC  107*     Recent Labs   Lab Test  18   0828  18   0553  18   GLC   --    --   107*   BGM  182*  165*  "  --

## 2018-04-07 NOTE — PHARMACY-ADMISSION MEDICATION HISTORY
Admission medication history interview status for the 4/6/2018  admission is complete. See EPIC admission navigator for prior to admission medications     Medication history source reliability:Good    Actions taken by pharmacist (provider contacted, etc): Spoke with pt's son to verify PTA meds - he was able to list off medication names. Called [facility] to verify doses and directions (last fills for all meds were March 22nd)    Additional medication history information not noted on PTA med list:    1. Medications added: Zetia, Ketotifen, Miralax, Atorvastatin  2. Medication removed: Simvastatin  3. Medication modified: Losartan switched to Losartan/HCTz    Medication reconciliation/reorder completed by provider prior to medication history? Yes    Time spent in this activity: 30 min    Prior to Admission medications    Medication Sig Last Dose Taking? Auth Provider   losartan-hydrochlorothiazide (HYZAAR) 50-12.5 MG per tablet Take 1 tablet by mouth daily 4/6/2018 at am Yes Unknown, Entered By History   Atorvastatin Calcium (LIPITOR PO) Take 40 mg by mouth At Bedtime 4/6/2018 at pm Yes Unknown, Entered By History   Ezetimibe (ZETIA PO) Take 10 mg by mouth At Bedtime 4/6/2018 at pm Yes Unknown, Entered By History   ketotifen (ZADITOR/REFRESH ANTI-ITCH) 0.025 % SOLN ophthalmic solution Place 1 drop into both eyes daily as needed for itching  at prn Yes Unknown, Entered By History   Polyethylene Glycol 3350 (MIRALAX PO) Take 17 g by mouth daily as needed  at prn Yes Unknown, Entered By History   GABAPENTIN PO Take 300 mg by mouth 2 times daily  4/6/2018 at pm Yes Reported, Patient   VITAMIN D, CHOLECALCIFEROL, PO Take 2,000 Units by mouth daily  4/6/2018 at am Yes Reported, Patient   METFORMIN HCL PO Take 500 mg by mouth 2 times daily (with meals)  4/6/2018 at pm Yes Reported, Patient   GLIMEPIRIDE PO Take 2 mg by mouth daily (with breakfast)  4/6/2018 at am Yes Reported, Patient

## 2018-04-07 NOTE — PLAN OF CARE
Problem: Patient Care Overview  Goal: Plan of Care/Patient Progress Review  PRIMARY DIAGNOSIS: ACUTE PAIN  OUTPATIENT/OBSERVATION GOALS TO BE MET BEFORE DISCHARGE:  1. Pain Status: Improved-controlled with oral pain medications.    2. Return to near baseline physical activity: No    3. Cleared for discharge by consultants (if involved): No; pt. Scheduled for chest x-ray, U/S, and MRI. SW, CC consult    Discharge Planner Nurse   Safe discharge environment identified: Yes  Barriers to discharge: Yes; pt. Yet to meet OBS goals       Entered by: Hayley Jimenez 04/07/2018 9:55 AM     Please review provider order for any additional goals.   Nurse to notify provider when observation goals have been met and patient is ready for discharge.

## 2018-04-07 NOTE — PLAN OF CARE
Problem: Patient Care Overview  Goal: Plan of Care/Patient Progress Review  Outcome: No Change  A&O x 4, VSS on RA, pain controlled with oral analgesics, R leg numbness reported, bedrest, voiding adequately in BEdpan, IV infusing,, continue to monitor.

## 2018-04-07 NOTE — H&P
"[facility]    History and Physical  Hospitalist       Date of Admission:  4/6/2018  Date of Service (when I saw the patient): 04/07/18    Assessment & Plan   Vamshi Murry is a pleasant 63 year old woman with Type 2 Diabetes mellitus, hypertension, dyslipidemia, hyperparathyroid, and fibromyalgia who presents with progressive right leg pain and fever.    1) Right hip and leg pain: Ms. Murry has hyperparathyroid that is followed by an Endocrinologist in Care Everywhere; she has declined surgery an\d the cause is thought to be \"high resorption state\". There is mention in care Everywhere of \"lytic bone lesion in right femur, noted incidentally in 2014\". I am unable to find further information about that at this time. Reportedly, she just had an MRI of her spine as an outpatient that is not in Care Everywhere.    Now she has progressive pain in her right hip. In the ED she has fever 100.4 and leukocytosis on labs 17 k; other vital signs are reassuringly unremarkable and prior Care Everywhere labs seem to suggest that she has a chronic intermittent high white count. BMP today shows K 3.3 and Ca 10.4. UA is negative. X-Rays of lumbar spine and pelvis and hip are negative for fracture or bony erosion. CT abdomen and pelvis is also negative for acute pathology (revealing only diverticulosis without diverticulitis).     Thus the cause of her pain and fever are unclear. History is very limited at this point and more information might be obtainable in AM. For now, I might have concern for underlying malignancy or occult infection.     -- Observation for now     -- Tylenol, Oxycodone, IV Dilaudid as needed for pain     -- Check Hepatic panel, blood cultures, Dopplers of all extremities, Influenza antigens, and chest x-ray     -- If records of recent MRI can not be obtained, consider repeating it here     -- Consider ID consult if above testing is unremarkable    2) Hypercalcemia: Seems mild, likely due to " parathyroid.    -- Check Ionized Ca, give  cc/hour    3) Hypokalemia: replacing    4) Type 2 Diabetes mellitus: Hold oral agents and use ISS insulin    5) Hypertension, dyslipidemia: resume outpatient medications when able    # Pain Assessment:  Current Pain Score 4/7/2018   Pain score (0-10) 9   - Vamshi is experiencing pain due to right hip. Pain management was discussed with Vamshi and her family and the plan was created in a collaborative fashion.  Vamshi's response to the current recommendations: adherent  - Please see the plan for pain management as documented above    DVT Prophylaxis: Pneumatic Compression Devices  Code Status: Full Code presumed    Disposition: Expected discharge in 0-2 days    Tuan Hogan MD    Primary Care Physician    **[facility]    Chief Complaint   Right hip pain    History is obtained from the patient and the ED physician whom I have spoken with    History of Present Illness   Vamshi Murry is a pleasant 63 year old woman, Kuwaiti speaking, who declines an . A family member is at the bedside and mostly speaks for her, answering questions that I put to her for her, seemingly at her request. Therefore history taking is limited.    She presents for pain in the right hip and upper right leg that started 2-3 days ago and has become more severe; she mainly feels it when it is having weight borne on it. Things have reached the point that she can not ambulate as normal with her walker. There is a report recently of an MRI done for evaluation of left hip pain at her clinic (results are not in care Everywhere); on my interview she denies having any pain in her left hip or leg. The pain in her right hip is partially relieved with narcotic medication in the ED. She (or rather her family member) denies fever, chills, sweats, weight loss, nausea, diarrhea, dysuria, upper back or chest pain, abdominal pain, headache, sore throat or any other acute complaints.    Past  Medical History    I have reviewed this patient's medical history and updated it with pertinent information if needed.   Past Medical History:   Diagnosis Date     Diabetes (H)      Dyslipidemia      Fibromyalgia      Hyperparathyroidism (H)      Hypertension        Past Surgical History   I have reviewed this patient's surgical history and updated it with pertinent information if needed.  Past Surgical History:   Procedure Laterality Date     HYSTERECTOMY         Prior to Admission Medications   Prior to Admission Medications   Prescriptions Last Dose Informant Patient Reported? Taking?   GABAPENTIN PO   Yes Yes   GLIMEPIRIDE PO   Yes Yes   METFORMIN HCL PO   Yes Yes   SIMVASTATIN PO   Yes Yes   VITAMIN D, CHOLECALCIFEROL, PO   Yes Yes   Sig: Take by mouth daily   losartan (COZAAR) 2.5 mg/mL SUSP   Yes Yes   Sig: Take by mouth daily      Facility-Administered Medications: None     Allergies   No Known Allergies    Social History   I have reviewed this patient's social history and updated it with pertinent information if needed. Vamshi Murry  reports that she has never smoked. She has never used smokeless tobacco. She reports that she does not drink alcohol or use illicit drugs.    Family History   Family history assessed and, except as above, is non-contributory.    Review of Systems   The 10 point Review of Systems is negative other than noted in the HPI or here.     Physical Exam   Temp: 98.3  F (36.8  C) Temp src: Oral BP: 134/55 (Simultaneous filing. User may not have seen previous data.)   Heart Rate: 78 Resp: 20 SpO2: 94 % O2 Device: None (Room air)    Vital Signs with Ranges  Temp:  [98.3  F (36.8  C)] 98.3  F (36.8  C)  Heart Rate:  [78] 78  Resp:  [20] 20  BP: (127-141)/() 134/55  SpO2:  [94 %-98 %] 94 %  140 lbs 0 oz    Constitutional: Alert and oriented to person; no apparent distress; appears very thin and frail  HEENT: normocephalic moist mucus membranes  Respiratory: lungs clear to  auscultation bilaterally  Cardiovascular: regular S1 S2   GI: abdomen soft non tender non distended bowel sounds positive  Lymph/Hematologic: no pallor, no cervical lymphadenopathy  Skin: no rash, good turgor  Musculoskeletal: no clubbing, cyanosis or edema  Neurologic: extra-ocular muscles intact; moves all four extremities  Psychiatric: appropriate affect, insight and judgment    Data   Data reviewed today:  I personally reviewed the abdominal CT image(s) showing no acute pathology.    Recent Labs  Lab 04/06/18 2033   WBC 16.8*   HGB 12.5   MCV 92         POTASSIUM 3.3*   CHLORIDE 98   CO2 30   BUN 22   CR 0.73   ANIONGAP 8   FARIBA 10.4*   *       Recent Results (from the past 24 hour(s))   XR Pelvis w Hip Right 1 View    Narrative    PELVIS AND RIGHT HIP 2 VIEWS  4/6/2018 11:02 PM     HISTORY: Pain.    COMPARISON: None.      Impression    IMPRESSION:  1. No visualized acute fracture, malalignment or other acute osseous  abnormality of the pelvis or right hip.  2. Diffuse osteopenia.    YAW HUDDLESTON MD   XR Lumbar Spine 2/3 Views    Narrative    LUMBAR SPINE 3 VIEWS  4/6/2018 11:02 PM     HISTORY: Low back pain and right-sided leg pain.    COMPARISON: None.      Impression    IMPRESSION:  1. No visualized acute fracture, malalignment or other acute osseous  abnormality of the lumbar spine.  2. Mild to moderate degenerative changes within the lumbar spine.  3. Atherosclerotic calcification in the abdominal aorta.    YAW HUDDLESTON MD   CT Abdomen Pelvis w Contrast    Narrative    CT ABDOMEN AND PELVIS WITH CONTRAST  4/6/2018 11:50 PM     HISTORY: Lower abdominal pain and right hip and leg pain.    COMPARISON: None.    TECHNIQUE: Following the uneventful administration of 71 mL Isovue-370  intravenous contrast, helical sections were acquired from the top of  the diaphragm through the pubic symphysis. Coronal reconstructions  were generated. Radiation dose for this scan was reduced  using  automated exposure control, adjustment of the mA and/or kV according  to the patient's size, or iterative reconstruction technique.    FINDINGS:  Abdomen: The liver, spleen, pancreas, adrenal glands and kidneys are  unremarkable. The gallbladder is present. No enlarged lymph nodes or  free fluid in the upper abdomen. Atherosclerotic calcification in the  abdominal aorta.    Scan through the lower chest is significant for coronary artery  calcification.    Pelvis: The small and large bowel are normal in caliber. A few colonic  diverticula are present without evidence of diverticulitis. The  appendix is unremarkable. No bowel wall thickening, pneumatosis or  free intraperitoneal gas. The uterus is not visualized. No enlarged  lymph nodes or free fluid in the pelvis. Unremarkable CT appearance of  the pelvis and hips. Note that evaluation of the inferior pelvis is  mildly limited due to motion artifact.      Impression    IMPRESSION:  1. No cause of acute pain identified in the abdomen or pelvis.  2. Colonic diverticulosis without evidence of diverticulitis.    YAW HUDDLESTON MD

## 2018-04-07 NOTE — PLAN OF CARE
Problem: Patient Care Overview  Goal: Plan of Care/Patient Progress Review  PRIMARY DIAGNOSIS: ACUTE PAIN  OUTPATIENT/OBSERVATION GOALS TO BE MET BEFORE DISCHARGE:  1. Pain Status: Improved-controlled with oral pain medications.    2. Return to near baseline physical activity: No    3. Cleared for discharge by consultants (if involved): No    Discharge Planner Nurse   Safe discharge environment identified: No  Barriers to discharge: Yes       Entered by: Shivani Rodriguez 04/07/2018 6:36 AM     Please review provider order for any additional goals.   Nurse to notify provider when observation goals have been met and patient is ready for discharge.    Pt admitted this shift. Unable to walk-used air mat. A-1 with bed mobility. Faroese speaking, son at bedside interpreting. Reports 6/10 pain RLE. Gave oxy 5mg. K 3.3, gave 20 Devang replacement. Mod CHO diet. . Checking influenza and RSV. CXR and upper and lower extremity US ordered. NS infusing at 100/hr. MDRO education completed.

## 2018-04-07 NOTE — PROGRESS NOTES
MD Notification    Notified Person: PA      Notified Person Name: Teri    Notification Date/Time:4/7/2018   4:55 PM    Notification Interaction: via text    Purpose of Notification: critical imaging non displaced R  Great trochanter reported by dr Guo     Orders Received: none. Ortho consult in.     Comments: pt will be seen by ortho. Pt to transfer to station 55

## 2018-04-07 NOTE — CONSULTS
Ortho    Imaging (MRI and Xray) reviewed showing a minimally displaced greater trochanteric fracture.  She can be weight bearing as tolerated with use of a walker.  I will see her for a formal consult tomorrow.  Recommend PT consult.  No operative plans for now.    Lauri Finnegan MD  [phone]

## 2018-04-07 NOTE — PLAN OF CARE
Problem: Patient Care Overview  Goal: Plan of Care/Patient Progress Review  PRIMARY DIAGNOSIS: ACUTE PAIN  OUTPATIENT/OBSERVATION GOALS TO BE MET BEFORE DISCHARGE:  1. Pain Status: Improved-controlled with oral pain medications.    2. Return to near baseline physical activity: No    3. Cleared for discharge by consultants (if involved): No;     Discharge Planner Nurse   Safe discharge environment identified: Yes  Barriers to discharge: Yes; pt. Yet to meet OBS goals       Entered by: Alyssa Osborn 04/07/2018 4:33 PM     Please review provider order for any additional goals.   Nurse to notify provider when observation goals have been met and patient is ready for discharge.

## 2018-04-07 NOTE — ED NOTES
Pt tried to ambulate with help of a walker, ERT, and family member. Pt is unable to ambulate and consistently slumps over onto the walker and bed when standing. MD notified.

## 2018-04-08 ENCOUNTER — APPOINTMENT (OUTPATIENT)
Dept: OCCUPATIONAL THERAPY | Facility: CLINIC | Age: 63
DRG: 536 | End: 2018-04-08
Attending: PHYSICIAN ASSISTANT
Payer: COMMERCIAL

## 2018-04-08 LAB
ANION GAP SERPL CALCULATED.3IONS-SCNC: 7 MMOL/L (ref 3–14)
BUN SERPL-MCNC: 12 MG/DL (ref 7–30)
CALCIUM SERPL-MCNC: 9.4 MG/DL (ref 8.5–10.1)
CHLORIDE SERPL-SCNC: 100 MMOL/L (ref 94–109)
CO2 SERPL-SCNC: 28 MMOL/L (ref 20–32)
CREAT SERPL-MCNC: 0.64 MG/DL (ref 0.52–1.04)
ERYTHROCYTE [DISTWIDTH] IN BLOOD BY AUTOMATED COUNT: 12.8 % (ref 10–15)
GFR SERPL CREATININE-BSD FRML MDRD: >90 ML/MIN/1.7M2
GLUCOSE BLDC GLUCOMTR-MCNC: 176 MG/DL (ref 70–99)
GLUCOSE BLDC GLUCOMTR-MCNC: 210 MG/DL (ref 70–99)
GLUCOSE BLDC GLUCOMTR-MCNC: 216 MG/DL (ref 70–99)
GLUCOSE BLDC GLUCOMTR-MCNC: 233 MG/DL (ref 70–99)
GLUCOSE BLDC GLUCOMTR-MCNC: 344 MG/DL (ref 70–99)
GLUCOSE SERPL-MCNC: 205 MG/DL (ref 70–99)
HCT VFR BLD AUTO: 32.8 % (ref 35–47)
HGB BLD-MCNC: 10.8 G/DL (ref 11.7–15.7)
LACTATE BLD-SCNC: 1.9 MMOL/L (ref 0.7–2)
MCH RBC QN AUTO: 30.6 PG (ref 26.5–33)
MCHC RBC AUTO-ENTMCNC: 32.9 G/DL (ref 31.5–36.5)
MCV RBC AUTO: 93 FL (ref 78–100)
PLATELET # BLD AUTO: 296 10E9/L (ref 150–450)
POTASSIUM SERPL-SCNC: 3.7 MMOL/L (ref 3.4–5.3)
RBC # BLD AUTO: 3.53 10E12/L (ref 3.8–5.2)
SODIUM SERPL-SCNC: 135 MMOL/L (ref 133–144)
WBC # BLD AUTO: 14.6 10E9/L (ref 4–11)

## 2018-04-08 PROCEDURE — 25000132 ZZH RX MED GY IP 250 OP 250 PS 637: Performed by: ORTHOPAEDIC SURGERY

## 2018-04-08 PROCEDURE — 36415 COLL VENOUS BLD VENIPUNCTURE: CPT | Performed by: PHYSICIAN ASSISTANT

## 2018-04-08 PROCEDURE — 25000132 ZZH RX MED GY IP 250 OP 250 PS 637: Performed by: HOSPITALIST

## 2018-04-08 PROCEDURE — 99233 SBSQ HOSP IP/OBS HIGH 50: CPT | Performed by: INTERNAL MEDICINE

## 2018-04-08 PROCEDURE — 85027 COMPLETE CBC AUTOMATED: CPT | Performed by: PHYSICIAN ASSISTANT

## 2018-04-08 PROCEDURE — 80048 BASIC METABOLIC PNL TOTAL CA: CPT | Performed by: PHYSICIAN ASSISTANT

## 2018-04-08 PROCEDURE — 36415 COLL VENOUS BLD VENIPUNCTURE: CPT | Performed by: INTERNAL MEDICINE

## 2018-04-08 PROCEDURE — 12000007 ZZH R&B INTERMEDIATE

## 2018-04-08 PROCEDURE — 00000146 ZZHCL STATISTIC GLUCOSE BY METER IP

## 2018-04-08 PROCEDURE — 25000132 ZZH RX MED GY IP 250 OP 250 PS 637: Performed by: INTERNAL MEDICINE

## 2018-04-08 PROCEDURE — 25000128 H RX IP 250 OP 636: Performed by: HOSPITALIST

## 2018-04-08 PROCEDURE — 97530 THERAPEUTIC ACTIVITIES: CPT | Mod: GO

## 2018-04-08 PROCEDURE — 83605 ASSAY OF LACTIC ACID: CPT | Performed by: INTERNAL MEDICINE

## 2018-04-08 PROCEDURE — 25000131 ZZH RX MED GY IP 250 OP 636 PS 637

## 2018-04-08 PROCEDURE — 25000128 H RX IP 250 OP 636: Performed by: INTERNAL MEDICINE

## 2018-04-08 PROCEDURE — 25000131 ZZH RX MED GY IP 250 OP 636 PS 637: Performed by: INTERNAL MEDICINE

## 2018-04-08 PROCEDURE — 40000133 ZZH STATISTIC OT WARD VISIT

## 2018-04-08 PROCEDURE — 97165 OT EVAL LOW COMPLEX 30 MIN: CPT | Mod: GO

## 2018-04-08 RX ORDER — ATORVASTATIN CALCIUM 40 MG/1
40 TABLET, FILM COATED ORAL AT BEDTIME
Status: DISCONTINUED | OUTPATIENT
Start: 2018-04-08 | End: 2018-04-10 | Stop reason: HOSPADM

## 2018-04-08 RX ORDER — HYDROMORPHONE HYDROCHLORIDE 2 MG/1
2-4 TABLET ORAL
Status: DISCONTINUED | OUTPATIENT
Start: 2018-04-08 | End: 2018-04-09

## 2018-04-08 RX ORDER — LOSARTAN POTASSIUM AND HYDROCHLOROTHIAZIDE 12.5; 5 MG/1; MG/1
1 TABLET ORAL DAILY
Status: DISCONTINUED | OUTPATIENT
Start: 2018-04-08 | End: 2018-04-10 | Stop reason: HOSPADM

## 2018-04-08 RX ORDER — GABAPENTIN 300 MG/1
300 CAPSULE ORAL 2 TIMES DAILY
Status: DISCONTINUED | OUTPATIENT
Start: 2018-04-08 | End: 2018-04-10 | Stop reason: HOSPADM

## 2018-04-08 RX ORDER — ACETAMINOPHEN 325 MG/1
975 TABLET ORAL 3 TIMES DAILY
Status: DISCONTINUED | OUTPATIENT
Start: 2018-04-08 | End: 2018-04-09

## 2018-04-08 RX ORDER — LIDOCAINE 4 G/G
1 PATCH TOPICAL
Status: DISCONTINUED | OUTPATIENT
Start: 2018-04-08 | End: 2018-04-10 | Stop reason: HOSPADM

## 2018-04-08 RX ORDER — SODIUM CHLORIDE 9 MG/ML
INJECTION, SOLUTION INTRAVENOUS CONTINUOUS
Status: DISCONTINUED | OUTPATIENT
Start: 2018-04-08 | End: 2018-04-09

## 2018-04-08 RX ORDER — LORAZEPAM 0.5 MG/1
0.5 TABLET ORAL ONCE
Status: COMPLETED | OUTPATIENT
Start: 2018-04-08 | End: 2018-04-08

## 2018-04-08 RX ORDER — POLYETHYLENE GLYCOL 3350 17 G/17G
17 POWDER, FOR SOLUTION ORAL DAILY PRN
Status: DISCONTINUED | OUTPATIENT
Start: 2018-04-08 | End: 2018-04-09

## 2018-04-08 RX ORDER — TRAMADOL HYDROCHLORIDE 50 MG/1
50 TABLET ORAL EVERY 6 HOURS PRN
Status: DISCONTINUED | OUTPATIENT
Start: 2018-04-08 | End: 2018-04-09

## 2018-04-08 RX ORDER — EZETIMIBE 10 MG/1
10 TABLET ORAL AT BEDTIME
Status: DISCONTINUED | OUTPATIENT
Start: 2018-04-08 | End: 2018-04-10 | Stop reason: HOSPADM

## 2018-04-08 RX ADMIN — INSULIN ASPART 2 UNITS: 100 INJECTION, SOLUTION INTRAVENOUS; SUBCUTANEOUS at 08:35

## 2018-04-08 RX ADMIN — EZETIMIBE 10 MG: 10 TABLET ORAL at 21:18

## 2018-04-08 RX ADMIN — ATORVASTATIN CALCIUM 40 MG: 40 TABLET, FILM COATED ORAL at 21:06

## 2018-04-08 RX ADMIN — LOSARTAN POTASSIUM AND HYDROCHLOROTHIAZIDE 1 TABLET: 50; 12.5 TABLET, FILM COATED ORAL at 18:29

## 2018-04-08 RX ADMIN — HYDROMORPHONE HYDROCHLORIDE 0.3 MG: 1 INJECTION, SOLUTION INTRAMUSCULAR; INTRAVENOUS; SUBCUTANEOUS at 10:02

## 2018-04-08 RX ADMIN — ENOXAPARIN SODIUM 30 MG: 30 INJECTION SUBCUTANEOUS at 19:17

## 2018-04-08 RX ADMIN — INSULIN ASPART 2 UNITS: 100 INJECTION, SOLUTION INTRAVENOUS; SUBCUTANEOUS at 13:42

## 2018-04-08 RX ADMIN — LORAZEPAM 0.5 MG: 0.5 TABLET ORAL at 11:11

## 2018-04-08 RX ADMIN — OXYCODONE HYDROCHLORIDE 10 MG: 5 TABLET ORAL at 03:07

## 2018-04-08 RX ADMIN — ACETAMINOPHEN 975 MG: 325 TABLET, FILM COATED ORAL at 18:44

## 2018-04-08 RX ADMIN — GABAPENTIN 300 MG: 300 CAPSULE ORAL at 21:16

## 2018-04-08 RX ADMIN — SENNOSIDES AND DOCUSATE SODIUM 2 TABLET: 8.6; 5 TABLET ORAL at 18:48

## 2018-04-08 RX ADMIN — LIDOCAINE 1 PATCH: 560 PATCH PERCUTANEOUS; TOPICAL; TRANSDERMAL at 19:21

## 2018-04-08 RX ADMIN — INSULIN GLARGINE 3 UNITS: 100 INJECTION, SOLUTION SUBCUTANEOUS at 18:56

## 2018-04-08 RX ADMIN — HYDROMORPHONE HYDROCHLORIDE 0.3 MG: 1 INJECTION, SOLUTION INTRAMUSCULAR; INTRAVENOUS; SUBCUTANEOUS at 17:51

## 2018-04-08 RX ADMIN — OXYCODONE HYDROCHLORIDE 10 MG: 5 TABLET ORAL at 06:29

## 2018-04-08 RX ADMIN — TRAMADOL HYDROCHLORIDE 50 MG: 50 TABLET, COATED ORAL at 21:07

## 2018-04-08 RX ADMIN — HYDROMORPHONE HYDROCHLORIDE 0.3 MG: 1 INJECTION, SOLUTION INTRAMUSCULAR; INTRAVENOUS; SUBCUTANEOUS at 15:33

## 2018-04-08 RX ADMIN — SODIUM CHLORIDE: 9 INJECTION, SOLUTION INTRAVENOUS at 18:32

## 2018-04-08 NOTE — PROGRESS NOTES
"[facility]    Hospitalist Progress Note    Assessment & Plan   Vamshi Murry is a 63 year old female who was admitted on 4/6/2018.     Right greater trochanteric fracture, mildly displaced oblique fx  -appreciate ortho seeing pt  -weight bear as tolerated  -no operative plans for now per ortho note  -talked with Dr. Finnegan and he will discuss plans with family tonight    Pain management  -difficult as pt refused today to take oral pain meds  -discussed with family that the oral pain meds would be more long lasting than the iv  -per nursing the patient is very comfortable but somewhat sedated after iv dilaudid  -will try lidocaine patch to right hip area  -trial of tramadol also   -pain management consult in am  -scheduled tylenol also  -per family the pt goes to a pain clinic for \"nerve pain\" where she is prescribed gabapentin  -asked family more specifically where nerve pain is and it is described as either \"all over\" or her back    Poor Po intake  -will restart iv fluids, NS at 75 cc/hr    Confusion  -per family, the patient is confused  -most likely this is the result of the pain meds  -will put on the delerium protocol    Fever  -temp 100.4  -UA neg, UC  4/7 pending  -BC 4/7/18 NGTD  -repeat CBC in am    Hypokalemia  -repeat BMP in am    Diabetes Type 2  -glucose 176, 216  -A1c 7.1  -glimiperide and metformin on hold  -will start some lantus, 3 units  -med SS      HTN  -/77, 131/56, 122/57  -restarted losartan/HCTZ with parameters    HLP  -restarted statin      # Pain Assessment:  Current Pain Score 4/8/2018   Patient currently in pain? -   Pain score (0-10) 10   Pain location -   Pain management discussed with family as in the note above.  They interpreted to the patient as she speaks somalie    DVT Prophylaxis: lovenox    Code Status: Full Code    Disposition: Expected discharge when pain management is better    Text Page (7am - 6pm)  Kateryna Lopez MD    Interval History   Very " much pain in the right leg    -Data reviewed today: I reviewed all new labs and imaging results over the last 24 hours. I personally reviewed no images or EKG's today.    Physical Exam   Temp: 98.7  F (37.1  C) Temp src: Oral BP: 150/77 Pulse: 73 Heart Rate: 82 Resp: 16 SpO2: 95 % O2 Device: None (Room air)    Vitals:    04/06/18 2202 04/07/18 0435 04/08/18 0406   Weight: 63.5 kg (140 lb) 59.6 kg (131 lb 6.3 oz) 59.6 kg (131 lb 6.4 oz)     Vital Signs with Ranges  Temp:  [98  F (36.7  C)-100.4  F (38  C)] 98.7  F (37.1  C)  Pulse:  [73] 73  Heart Rate:  [68-90] 82  Resp:  [16] 16  BP: (122-152)/(56-77) 150/77  SpO2:  [94 %-97 %] 95 %  I/O last 3 completed shifts:  In: 240 [P.O.:240]  Out: 350 [Urine:350]    Constitutional: alert, but crying in pain  Respiratory: clear to auscultation, no wheezing or rhonchi   Cardiovascular: regular rate and rhythm without murmer or rub   GI:positive bowel sounds, non-tender   Skin/Integumen: no rashes    Other:        Medications     sodium chloride 100 mL/hr at 04/07/18 1801       sodium chloride (PF)  3 mL Intracatheter Q8H     insulin aspart  1-7 Units Subcutaneous TID AC     insulin aspart  1-5 Units Subcutaneous At Bedtime       Data     Recent Labs  Lab 04/08/18  0805 04/07/18  1036 04/07/18  0635 04/06/18  2033   WBC 14.6*  --  15.4* 16.8*   HGB 10.8*  --  11.3* 12.5   MCV 93  --  92 92     --  308 338    132*  --  136   POTASSIUM 3.7 3.9  --  3.3*   CHLORIDE 100 98  --  98   CO2 28 28  --  30   BUN 12 19  --  22   CR 0.64 0.78  --  0.73   ANIONGAP 7 6  --  8   FARIBA 9.4 9.7  --  10.4*   * 219*  --  107*   ALBUMIN  --   --  3.5  --    PROTTOTAL  --   --  6.7*  --    BILITOTAL  --   --  1.0  --    ALKPHOS  --   --  45  --    ALT  --   --  16  --    AST  --   --  15  --        No results found for this or any previous visit (from the past 24 hour(s)).

## 2018-04-08 NOTE — PLAN OF CARE
Problem: Patient Care Overview  Goal: Plan of Care/Patient Progress Review  Outcome: No Change  Patient was alert last night but got confused and aggressive during the night . She scratched the nurse with her teeth trying to bite when changing her, she only speaks Macedonian, but her son his in the room to interpret. VSS, CMS intact, incontinence, IV infusing, and oxycodone for pain control. Will continue to monitor

## 2018-04-08 NOTE — PLAN OF CARE
Problem: Patient Care Overview  Goal: Plan of Care/Patient Progress Review  Discharge Planner OT   Patient plan for discharge: Unsure.  Son concerned about her pain level and her need for assist at this time.    Current status: Orders Received, Chart Reviewed and evaluation/tx completed.  Pt has a RLE trochanter fracture with displacement.  Per Ortho Note, pt is WBAT to the RLE.   Pt pleasant and followed 1-step commands with son interpreting.    She attempted to sit up to EOB with Mod A; however, unable to tolerate moving the RLE.  She was able to dangle her unaffected LLE at EOB.   Pt scooted self to HOB longterm; however, moaning in pain with limited toleration.  Son asking if surgery was an option as she is in so much pain and he is concerned about immobility.   Encouraged son to discuss options with Ortho docs.   Pt lives in a two story home home with her 2 adult sons.  She stays on the main level and has a walk in shower there.  Her son helps her with bathing, dressing and toileting at a Min A level.   Pt uses a walker and is able to walk the length of her home prior to this injury.  Barriers to return to prior living situation: Pain due to fracture, increased need for assist for ADLS and transfers  Recommendations for discharge: TCU  Rationale for recommendations: Pt requires a significant amount of assist than her PLOF.   She also presents with significant pain that contributes to immobility.       Entered by: Jose Carlos Lang 04/08/2018 9:10 AM

## 2018-04-08 NOTE — PLAN OF CARE
Problem: Patient Care Overview  Goal: Plan of Care/Patient Progress Review  PT: PT orders received. Pt just finished with OT. Discussed with OT, pt able to minimally participate in bed mobility, limited by significant pain. Per discussion with OT pt was unable to tolerate transition to EOB or any movement of RLE. OT recommends holding PT eval at this time.

## 2018-04-08 NOTE — PLAN OF CARE
Problem: Patient Care Overview  Goal: Plan of Care/Patient Progress Review  Outcome: Improving  Pt remains A/O requires interpretation, son at bedside. Turn and repo with A2. Bedpan/incont at times. Po dilaudid for pain. Mod cho diet. IV SL. Refused to get up OOB. Continue to monitor.

## 2018-04-08 NOTE — PROGRESS NOTES
04/08/18 0830   Quick Adds   Type of Visit Initial Occupational Therapy Evaluation   Living Environment   Lives With child(addison), adult  (pt and brother  )   Living Arrangements house   Home Accessibility stairs within home;stairs to enter home   Number of Stairs to Enter Home 3   Number of Stairs Within Home 17  (pt resides on first floor)   Living Environment Comment walk in showser on first level that pt can use.   Self-Care   Dominant Hand right   Usual Activity Tolerance moderate   Current Activity Tolerance poor   Regular Exercise no   Equipment Currently Used at Home walker, rolling  (uses a rollator)   Functional Level Prior   Ambulation 1-->assistive equipment   Transferring 1-->assistive equipment   Toileting 2-->assistive person   Bathing 3-->assistive equipment and person   Dressing 2-->assistive person   Eating 0-->independent   Communication 0-->understands/communicates without difficulty   Swallowing 0-->swallows foods/liquids without difficulty   Cognition 0 - no cognition issues reported   Fall history within last six months yes   Number of times patient has fallen within last six months 4   Which of the above functional risks had a recent onset or change? ambulation;bathing;toileting   General Information   Onset of Illness/Injury or Date of Surgery - Date 04/06/18   Cognitive Status Examination   Orientation place  (described as a place for medicine rather than stating hospit)   Level of Consciousness alert   Able to Follow Commands success, 1-step commands   Personal Safety (Cognitive) mild impairment   Memory impaired   Attention Distractible during evaluation   Organization/Problem Solving Sequencing impaired;Problem solving impaired;Categorization of information impaired;Prioritizing of information/tasks impaired   Executive Function Self awareness/monitoring impaired;Initiation impaired   Visual Perception   Visual Perception Comments None noted   Posture   Posture not impaired   Range of  "Motion (ROM)   ROM Comment UB WFL   Hand Strength   Hand Strength Comments WFL   Coordination   Upper Extremity Coordination No deficits were identified   Coordination Comments WFL   Transfer Skills   Transfer Comments Unable to complete due to pain.   Toilet Transfer   Toilet Transfer Comments unable to complete due to pain.   Upper Body Dressing   Level of [place]: Dress Upper Body moderate assist (50% patients effort)   Lower Body Dressing   Level of [place]: Dress Lower Body dependent (less than 25% patients effort)   Toileting   Level of [place]: Toilet dependent (less than 25% patients effort)   Grooming   Level of [place]: Grooming moderate assist (50% patients effort)   Eating/Self Feeding   Level of [place]: Eating moderate assist (50% patients effort)   Instrumental Activities of Daily Living (IADL)   IADL Comments Son assisted with all ADLs and IADLs   Activities of Daily Living Analysis   Impairments Contributing to Impaired Activities of Daily Living balance impaired;pain;strength decreased   Clinical Impression   Criteria for Skilled Therapeutic Interventions Met yes, treatment indicated   OT Diagnosis Decreased independence with ADLs   Assessment of Occupational Performance 1-3 Performance Deficits   Identified Performance Deficits dressing, toileting, social skills   Clinical Decision Making (Complexity) Low complexity   Therapy Frequency 5 times/wk   Predicted Duration of Therapy Intervention (days/wks) 1 week   Anticipated Discharge Disposition Transitional Care Facility   Risks and Benefits of Treatment have been explained. Yes   Patient, Family & other staff in agreement with plan of care Yes   [facility] AM-PAC  \"6 Clicks\" Daily Activity Inpatient Short Form   1. Putting on and taking off regular lower body clothing? 1 - Total   2. Bathing (including washing, rinsing, drying)? 2 - A Lot   3. Toileting, which includes using toilet, bedpan or urinal? 2 - A Lot   4. " Putting on and taking off regular upper body clothing? 3 - A Little   5. Taking care of personal grooming such as brushing teeth? 2 - A Lot   6. Eating meals? 2 - A Lot   Daily Activity Raw Score (Score out of 24.Lower scores equate to lower levels of function) 12   Total Evaluation Time   Total Evaluation Time (Minutes) 5

## 2018-04-09 ENCOUNTER — APPOINTMENT (OUTPATIENT)
Dept: PHYSICAL THERAPY | Facility: CLINIC | Age: 63
DRG: 536 | End: 2018-04-09
Attending: PHYSICIAN ASSISTANT
Payer: COMMERCIAL

## 2018-04-09 ENCOUNTER — APPOINTMENT (OUTPATIENT)
Dept: OCCUPATIONAL THERAPY | Facility: CLINIC | Age: 63
DRG: 536 | End: 2018-04-09
Payer: COMMERCIAL

## 2018-04-09 LAB
ANION GAP SERPL CALCULATED.3IONS-SCNC: 7 MMOL/L (ref 3–14)
BACTERIA SPEC CULT: NORMAL
BACTERIA SPEC CULT: NORMAL
BUN SERPL-MCNC: 10 MG/DL (ref 7–30)
CALCIUM SERPL-MCNC: 9.2 MG/DL (ref 8.5–10.1)
CHLORIDE SERPL-SCNC: 102 MMOL/L (ref 94–109)
CO2 SERPL-SCNC: 27 MMOL/L (ref 20–32)
CREAT SERPL-MCNC: 0.62 MG/DL (ref 0.52–1.04)
ERYTHROCYTE [DISTWIDTH] IN BLOOD BY AUTOMATED COUNT: 12.6 % (ref 10–15)
FERRITIN SERPL-MCNC: 382 NG/ML (ref 8–252)
FOLATE SERPL-MCNC: 11.3 NG/ML
GFR SERPL CREATININE-BSD FRML MDRD: >90 ML/MIN/1.7M2
GLUCOSE BLDC GLUCOMTR-MCNC: 177 MG/DL (ref 70–99)
GLUCOSE BLDC GLUCOMTR-MCNC: 205 MG/DL (ref 70–99)
GLUCOSE BLDC GLUCOMTR-MCNC: 238 MG/DL (ref 70–99)
GLUCOSE BLDC GLUCOMTR-MCNC: 245 MG/DL (ref 70–99)
GLUCOSE BLDC GLUCOMTR-MCNC: 269 MG/DL (ref 70–99)
GLUCOSE SERPL-MCNC: 203 MG/DL (ref 70–99)
HAPTOGLOB SERPL-MCNC: 170 MG/DL (ref 35–175)
HCT VFR BLD AUTO: 32.1 % (ref 35–47)
HGB BLD-MCNC: 10.4 G/DL (ref 11.7–15.7)
IRON SATN MFR SERPL: 12 % (ref 15–46)
IRON SERPL-MCNC: 25 UG/DL (ref 35–180)
Lab: NORMAL
MCH RBC QN AUTO: 30.3 PG (ref 26.5–33)
MCHC RBC AUTO-ENTMCNC: 32.4 G/DL (ref 31.5–36.5)
MCV RBC AUTO: 94 FL (ref 78–100)
PLATELET # BLD AUTO: 297 10E9/L (ref 150–450)
POTASSIUM SERPL-SCNC: 3.6 MMOL/L (ref 3.4–5.3)
RBC # BLD AUTO: 3.43 10E12/L (ref 3.8–5.2)
RETICS # AUTO: 57.6 10E9/L (ref 25–95)
RETICS/RBC NFR AUTO: 1.7 % (ref 0.5–2)
SODIUM SERPL-SCNC: 136 MMOL/L (ref 133–144)
SPECIMEN SOURCE: NORMAL
TIBC SERPL-MCNC: 202 UG/DL (ref 240–430)
VIT B12 SERPL-MCNC: 140 PG/ML (ref 193–986)
WBC # BLD AUTO: 12.2 10E9/L (ref 4–11)

## 2018-04-09 PROCEDURE — 85027 COMPLETE CBC AUTOMATED: CPT | Performed by: INTERNAL MEDICINE

## 2018-04-09 PROCEDURE — 25000131 ZZH RX MED GY IP 250 OP 636 PS 637: Performed by: INTERNAL MEDICINE

## 2018-04-09 PROCEDURE — 97530 THERAPEUTIC ACTIVITIES: CPT | Mod: GP | Performed by: PHYSICAL THERAPIST

## 2018-04-09 PROCEDURE — 99233 SBSQ HOSP IP/OBS HIGH 50: CPT | Performed by: INTERNAL MEDICINE

## 2018-04-09 PROCEDURE — 85045 AUTOMATED RETICULOCYTE COUNT: CPT | Performed by: INTERNAL MEDICINE

## 2018-04-09 PROCEDURE — 25000131 ZZH RX MED GY IP 250 OP 636 PS 637

## 2018-04-09 PROCEDURE — 82728 ASSAY OF FERRITIN: CPT | Performed by: INTERNAL MEDICINE

## 2018-04-09 PROCEDURE — 12000007 ZZH R&B INTERMEDIATE

## 2018-04-09 PROCEDURE — 25000128 H RX IP 250 OP 636: Performed by: INTERNAL MEDICINE

## 2018-04-09 PROCEDURE — 80048 BASIC METABOLIC PNL TOTAL CA: CPT | Performed by: INTERNAL MEDICINE

## 2018-04-09 PROCEDURE — 97116 GAIT TRAINING THERAPY: CPT | Mod: GP | Performed by: PHYSICAL THERAPIST

## 2018-04-09 PROCEDURE — 25000132 ZZH RX MED GY IP 250 OP 250 PS 637: Performed by: ORTHOPAEDIC SURGERY

## 2018-04-09 PROCEDURE — 82607 VITAMIN B-12: CPT | Performed by: INTERNAL MEDICINE

## 2018-04-09 PROCEDURE — 83540 ASSAY OF IRON: CPT | Performed by: INTERNAL MEDICINE

## 2018-04-09 PROCEDURE — 40000133 ZZH STATISTIC OT WARD VISIT: Performed by: OCCUPATIONAL THERAPIST

## 2018-04-09 PROCEDURE — 25000132 ZZH RX MED GY IP 250 OP 250 PS 637: Performed by: INTERNAL MEDICINE

## 2018-04-09 PROCEDURE — 00000146 ZZHCL STATISTIC GLUCOSE BY METER IP

## 2018-04-09 PROCEDURE — 97162 PT EVAL MOD COMPLEX 30 MIN: CPT | Mod: GP | Performed by: PHYSICAL THERAPIST

## 2018-04-09 PROCEDURE — 25000132 ZZH RX MED GY IP 250 OP 250 PS 637: Performed by: HOSPITALIST

## 2018-04-09 PROCEDURE — 40000193 ZZH STATISTIC PT WARD VISIT: Performed by: PHYSICAL THERAPIST

## 2018-04-09 PROCEDURE — 36415 COLL VENOUS BLD VENIPUNCTURE: CPT | Performed by: INTERNAL MEDICINE

## 2018-04-09 PROCEDURE — 82746 ASSAY OF FOLIC ACID SERUM: CPT | Performed by: INTERNAL MEDICINE

## 2018-04-09 PROCEDURE — 83010 ASSAY OF HAPTOGLOBIN QUANT: CPT | Performed by: INTERNAL MEDICINE

## 2018-04-09 PROCEDURE — 97530 THERAPEUTIC ACTIVITIES: CPT | Mod: GO | Performed by: OCCUPATIONAL THERAPIST

## 2018-04-09 PROCEDURE — 83550 IRON BINDING TEST: CPT | Performed by: INTERNAL MEDICINE

## 2018-04-09 RX ORDER — HYDROXYZINE HYDROCHLORIDE 10 MG/1
10 TABLET, FILM COATED ORAL EVERY 4 HOURS PRN
Status: DISCONTINUED | OUTPATIENT
Start: 2018-04-09 | End: 2018-04-10 | Stop reason: HOSPADM

## 2018-04-09 RX ORDER — POLYETHYLENE GLYCOL 3350 17 G/17G
17 POWDER, FOR SOLUTION ORAL 2 TIMES DAILY PRN
Status: DISCONTINUED | OUTPATIENT
Start: 2018-04-09 | End: 2018-04-10 | Stop reason: HOSPADM

## 2018-04-09 RX ORDER — BISACODYL 10 MG
10 SUPPOSITORY, RECTAL RECTAL DAILY PRN
Status: DISCONTINUED | OUTPATIENT
Start: 2018-04-09 | End: 2018-04-10 | Stop reason: HOSPADM

## 2018-04-09 RX ORDER — ANALGESIC BALM 1.74; 4.06 G/29G; G/29G
OINTMENT TOPICAL EVERY 6 HOURS PRN
Status: DISCONTINUED | OUTPATIENT
Start: 2018-04-09 | End: 2018-04-10 | Stop reason: HOSPADM

## 2018-04-09 RX ORDER — DOCUSATE SODIUM 100 MG/1
100 CAPSULE, LIQUID FILLED ORAL 2 TIMES DAILY
Status: DISCONTINUED | OUTPATIENT
Start: 2018-04-09 | End: 2018-04-10 | Stop reason: HOSPADM

## 2018-04-09 RX ORDER — TRAMADOL HYDROCHLORIDE 100 MG/1
100 TABLET, EXTENDED RELEASE ORAL DAILY
Status: DISCONTINUED | OUTPATIENT
Start: 2018-04-09 | End: 2018-04-10 | Stop reason: HOSPADM

## 2018-04-09 RX ORDER — OXYCODONE AND ACETAMINOPHEN 5; 325 MG/1; MG/1
1 TABLET ORAL EVERY 4 HOURS PRN
Status: DISCONTINUED | OUTPATIENT
Start: 2018-04-09 | End: 2018-04-10 | Stop reason: HOSPADM

## 2018-04-09 RX ORDER — HYDRALAZINE HYDROCHLORIDE 10 MG/1
10 TABLET, FILM COATED ORAL EVERY 4 HOURS PRN
Status: DISCONTINUED | OUTPATIENT
Start: 2018-04-09 | End: 2018-04-09

## 2018-04-09 RX ADMIN — ACETAMINOPHEN 975 MG: 325 TABLET, FILM COATED ORAL at 10:00

## 2018-04-09 RX ADMIN — LIDOCAINE 1 PATCH: 560 PATCH PERCUTANEOUS; TOPICAL; TRANSDERMAL at 20:38

## 2018-04-09 RX ADMIN — HYDROMORPHONE HYDROCHLORIDE 4 MG: 2 TABLET ORAL at 12:51

## 2018-04-09 RX ADMIN — ANALGESIC BALM: 1.74; 4.06 OINTMENT TOPICAL at 17:54

## 2018-04-09 RX ADMIN — GABAPENTIN 300 MG: 300 CAPSULE ORAL at 08:05

## 2018-04-09 RX ADMIN — INSULIN ASPART 1 UNITS: 100 INJECTION, SOLUTION INTRAVENOUS; SUBCUTANEOUS at 08:21

## 2018-04-09 RX ADMIN — HYDROMORPHONE HYDROCHLORIDE 4 MG: 2 TABLET ORAL at 08:06

## 2018-04-09 RX ADMIN — ACETAMINOPHEN 975 MG: 325 TABLET, FILM COATED ORAL at 01:50

## 2018-04-09 RX ADMIN — OXYCODONE HYDROCHLORIDE AND ACETAMINOPHEN 1 TABLET: 5; 325 TABLET ORAL at 20:38

## 2018-04-09 RX ADMIN — HYDROMORPHONE HYDROCHLORIDE 4 MG: 2 TABLET ORAL at 16:43

## 2018-04-09 RX ADMIN — EZETIMIBE 10 MG: 10 TABLET ORAL at 22:56

## 2018-04-09 RX ADMIN — LOSARTAN POTASSIUM AND HYDROCHLOROTHIAZIDE 1 TABLET: 50; 12.5 TABLET, FILM COATED ORAL at 08:05

## 2018-04-09 RX ADMIN — ENOXAPARIN SODIUM 30 MG: 30 INJECTION SUBCUTANEOUS at 18:50

## 2018-04-09 RX ADMIN — VITAMIN D, TAB 1000IU (100/BT) 2000 UNITS: 25 TAB at 08:05

## 2018-04-09 RX ADMIN — DOCUSATE SODIUM 100 MG: 100 CAPSULE, LIQUID FILLED ORAL at 08:06

## 2018-04-09 RX ADMIN — INSULIN GLARGINE 6 UNITS: 100 INJECTION, SOLUTION SUBCUTANEOUS at 17:54

## 2018-04-09 RX ADMIN — HYDROXYZINE HYDROCHLORIDE 10 MG: 10 TABLET ORAL at 20:47

## 2018-04-09 RX ADMIN — TRAMADOL HYDROCHLORIDE 100 MG: 100 TABLET, EXTENDED RELEASE ORAL at 18:50

## 2018-04-09 RX ADMIN — HYDROMORPHONE HYDROCHLORIDE 4 MG: 2 TABLET ORAL at 01:49

## 2018-04-09 RX ADMIN — ATORVASTATIN CALCIUM 40 MG: 40 TABLET, FILM COATED ORAL at 22:55

## 2018-04-09 RX ADMIN — INSULIN ASPART 3 UNITS: 100 INJECTION, SOLUTION INTRAVENOUS; SUBCUTANEOUS at 17:53

## 2018-04-09 RX ADMIN — SENNOSIDES AND DOCUSATE SODIUM 2 TABLET: 8.6; 5 TABLET ORAL at 17:54

## 2018-04-09 RX ADMIN — INSULIN ASPART 2 UNITS: 100 INJECTION, SOLUTION INTRAVENOUS; SUBCUTANEOUS at 12:51

## 2018-04-09 RX ADMIN — GABAPENTIN 300 MG: 300 CAPSULE ORAL at 20:38

## 2018-04-09 NOTE — PROGRESS NOTES
"[facility]    Hospitalist Progress Note    Assessment & Plan   Vamshi Murry is a 63 year old female who was admitted on 4/6/2018.     Right greater trochanteric fracture, mildly displaced oblique fx  -pelvic xray did not show fracture but MRI of right hip did  -appreciate ortho seeing pt  -weight bear as tolerated  -no operative plans for now per ortho note  -son wants pt to return home, await PT to eval how she is going to weight bear and walk         Pain management  -difficult  Yesterday,4/8/18,as pt refused to take oral pain meds  -discussed with family that the oral pain meds would be more long lasting than the iv  -per nursing the patient is very comfortable but somewhat sedated after iv dilaudid  -per family the pt goes to a pain clinic for \"nerve pain\" where she is prescribed gabapentin  -asked family more specifically where nerve pain is and it is described as either \"all over\" or her back  -today, 4/9, the patient is much more comfortable.  She says that her pain in the right hip area is only mild  -much better pain control today, so will cancel the pain consult team  -continue the scheduled tylenol, lidocaine patch and oral dilaudid prn    Addendum at 1330, pain right hip 6/10.  Will order icy hot (menthol) gel prn.  Had cancelled pain team consult this am as her pain is much better controlled, but as pt may go home tomorrow with family would appreciate if pain team could see pt today.      Addendum 1715, pain team consult reviewed.  They recommend d/c dilaudid, change to percocet 1 tab q 4 hours prn, hydroxyzine 10 mg po q 4 hours prn ( to take with percocet) and tramadol  mg po daily.  Continue bengay and lidoderm patches.    Will change meds to this regime.       Poor Po intake  -restarted  iv fluids, NS at 75 cc/hr 4/8  -hopefully the patient will eat better today as her pain is better controlled.  -will saline lock later today if her po intake inproves  -for now will " decrease to 50 cc/hr     Confusion  -per family, the patient was confused yesterday when she was only taking the oral dilaudid for pain  -did get some seroquel last night for restlessness  -today per son who acts as her , she is not confused  -continue to monitor     Fever  -temp 100.4yesterday am, 4/8/18 at 0821  -UA neg, UC  4/7 pending  -BC 4/7/18 NGTD  - WBC is decreasing, 12.2<14.6<15.4<16.8  -CT of chest 4/7/18 showed bilateral lower lobe pulmonary opacities, favored to represent atelectasis, difficult to exclude an acute infectious process.  -currently not on abx, will continue to monitor     Hypokalemia  -repeat BMP in am     Diabetes Type 2  -glucose 176, 216  -A1c 7.1  -glimiperide and metformin on hold  -glucoses  203 today, yesterday 176, 216, 344  -will inc lantus to 6 units today  -med SS    Anemia  -[id number], iron 25, , % sat 12  -Retic pending  -most c/w Fe def anemia  -recommend iron on discharge, outpatient work-up including colonoscopy is she has not had one  -hgb 10.4<10.8        HTN  -/77, 131/56, 122/57  -restarted losartan/HCTZ with parameters 4/8  Last /43     HLP  -restarted statin       # Pain Assessment:  Current Pain Score 4/9/2018   Patient currently in pain? -   Pain score (0-10) 2   Pain location -   Pain descriptors -   - aVmshi is experiencing pain due to right hip pain from greater trochanteric fx. Pain management was discussed with Vamshi and her family and the plan was created in a collaborative fashion.  Vamshi's response to the current recommendations: engaged  - Please see the plan for pain management as documented above      DVT Prophylaxis: lovenox  Code Status: Full Code    Disposition: Expected discharge possible tomorrow if does well with PT    Text Page (7am - 6pm)  Kateryna Lopez MD    Interval History   Pain control much better, denies SOB, chest pain or abd pain.  Son reports that she has had a bowel movement    -Data reviewed  today: I reviewed all new labs and imaging results over the last 24 hours. I personally reviewed no images or EKG's today.    Physical Exam   Temp: 97.8  F (36.6  C) Temp src: Oral BP: 117/43 Pulse: 59 Heart Rate: 82 Resp: 19 SpO2: 97 % O2 Device: None (Room air)    Vitals:    04/06/18 2202 04/07/18 0435 04/08/18 0406   Weight: 63.5 kg (140 lb) 59.6 kg (131 lb 6.3 oz) 59.6 kg (131 lb 6.4 oz)     Vital Signs with Ranges  Temp:  [97.8  F (36.6  C)-100.4  F (38  C)] 97.8  F (36.6  C)  Pulse:  [59-79] 59  Heart Rate:  [68-82] 82  Resp:  [16-24] 19  BP: (117-157)/(43-77) 117/43  SpO2:  [95 %-97 %] 97 %  I/O last 3 completed shifts:  In: 250 [P.O.:250]  Out: -     Constitutional: alert   Respiratory: clear to auscultation, no wheezing or rhonchi   Cardiovascular: regular rate and rhythm without murmer or rub   GI:positive bowel sounds, non-tender   Skin/Integumen: no rashes    Other:        Medications     sodium chloride 75 mL/hr at 04/08/18 1832       gabapentin (NEURONTIN) capsule 300 mg  300 mg Oral BID     atorvastatin (LIPITOR) tablet 40 mg  40 mg Oral At Bedtime     ezetimibe (ZETIA) tablet 10 mg  10 mg Oral At Bedtime     losartan-hydrochlorothiazide  1 tablet Oral Daily     cholecalciferol  2,000 Units Oral Daily     acetaminophen  975 mg Oral TID     lidocaine  1 patch Transdermal Q24h    And     lidocaine   Transdermal Q24H    And     lidocaine   Transdermal Q8H     insulin glargine  3 Units Subcutaneous Daily     enoxaparin  30 mg Subcutaneous Q24H     sodium chloride (PF)  3 mL Intracatheter Q8H     insulin aspart  1-7 Units Subcutaneous TID AC     insulin aspart  1-5 Units Subcutaneous At Bedtime       Data     Recent Labs  Lab 04/09/18  0625 04/08/18  0805 04/07/18  1036 04/07/18  0635 04/06/18  2033   WBC 12.2* 14.6*  --  15.4* 16.8*   HGB 10.4* 10.8*  --  11.3* 12.5   MCV 94 93  --  92 92    296  --  308 338   NA  --  135 132*  --  136   POTASSIUM  --  3.7 3.9  --  3.3*   CHLORIDE  --  100 98  --   98   CO2  --  28 28  --  30   BUN  --  12 19  --  22   CR  --  0.64 0.78  --  0.73   ANIONGAP  --  7 6  --  8   FARIBA  --  9.4 9.7  --  10.4*   GLC  --  205* 219*  --  107*   ALBUMIN  --   --   --  3.5  --    PROTTOTAL  --   --   --  6.7*  --    BILITOTAL  --   --   --  1.0  --    ALKPHOS  --   --   --  45  --    ALT  --   --   --  16  --    AST  --   --   --  15  --        No results found for this or any previous visit (from the past 24 hour(s)).

## 2018-04-09 NOTE — CONSULTS
Inpatient Pharmacy Pain Consult:   I met with Vamshi for about fifteen minutes.   Other present during the interview include: Two sons   Subjective:  Alertness: She was alert and interacted conversationally with her sons. She has just worked with the therapist to navigate a trip to the restroom. She presented as resting, but not sleeping.   Anxiety: We did not discuss.   Competency as an historian at this time: Her sons were able to add to the story with clarity.   Non-Verbal Pain presentation: She grimaced a few times; but mostly appeared comfortable at rest.   Patient description of pain at this time: Right hip pain with radiating pain down the right thigh and to the back.   Triggers for pain: Movement.   Pain Score(s): Four out of ten  Non-Pain Score Assessment of pain Control:  Quality of sleep/rest: Yes  Participation in activity-therapy as ordered: Yes   Ability to focus away from pain: Yes  Objective:  Pertinent medical conditions/Labs to be aware for managing pain: Diabetes, dyslipidemia, fibromyalgia, hyperparathyroidism, hypertension, chronic nerve pain, right greater trochanteric fracture, mildly displaced oblique fracture  Pertinent pain medication allergies/sensitivities: NKA  Chronic pain history: She has chronic nerve pain and fibromyalgia.   Pertinent pain medication history: Admitted on Gabapentin. I reviewed the [place] Rx database as saw two opiate prescriptions over the last year (Vicodin 5/325 on 3/29/10 and Percocet 7.5/325 on 12/13/17). She also had a prescription for Valium 5mg filled on 3/29/18.   Side effects from pain medications: Concern for fall risk.   Addiction history: Nothing noted. She did NOT present as drug seeking. Her sons were in favor of numbing medication.   Assessment:  We discussed mental diversion techniques to focus away from pain including focused breathing. I encouraged patient to divert thoughts to activities of comfort (music, reading, TV, thinking about favorite  activities).  NO; supportive family appears to be therapeutic.   We discussed reasonable goals that consider optimizing pain control and assuring a safe regimen.   Opiate Evaluation:  Consider changing Tramadol to 100 mg ER PO once daily.   Consider stopping Dilaudid and switching to Percocet 5/325 1 tablet PO Q 4 H PRN.   Non-Opiate Evaluation:  Acetaminophen:  See Percocet suggestion.   Anti-anxiety agents: Do not recommend at this time.   Anti-depressants: Do not recommend at this time.  Anti-histamines: Consider adding Hydroxyzine 10mg PO Q 4 H PRN pain; taken with Percocet.   Muscle relaxants: Do not recommend at this time.  Neuropathic agents: Agree with Gabapentin 300 mg PO BID.   NSAIDS: Do not recommend at this time with cardia history.   Topical products: Agree with trial of Wong-Baez and Lidoderm Patch.   ICE/Heat: Offer both.   Physical Therapy: Yes  Plan/Recommendations:  1. Consider changing Tramadol to 100 mg ER PO once daily.   2. Consider stopping Dilaudid and switching to Percocet 5/325 1 tablet PO Q 4 H PRN.   3. Consider adding Hydroxyzine 10mg PO Q 4 H PRN pain; taken with Percocet.   Thank you for this consult!  Elier Sánchez, Cell Phone: [phone]

## 2018-04-09 NOTE — PROGRESS NOTES
X cover    12.5 mg of seroquel was ordered for agitation and restlessness.  RN also advised on rapidly de-escalating narcotic use.    Damir Leonard MD

## 2018-04-09 NOTE — CONSULTS
Consult Date:  04/08/2018      REASON FOR CONSULTATION:  Right greater trochanteric hip fracture.      HISTORY OF PRESENT ILLNESS:  The patient is a 63-year-old female who describes some mild pain and weakness in her right leg.  She was getting up to go to the bathroom and she fell back onto her right side at that time.  She had more acute onset of pain and difficulty with ambulating and was therefore brought into the Emergency Department.  She did not hit her head or have any loss of consciousness.  Initially she was admitted with x-rays that were unremarkable but then was found on MRI to have a minimally-displaced greater trochanteric hip fracture.  She has been quite uncomfortable on the floor although this evening the patient and her son note that her pain significantly improved and she has been able to move around a little bit better without pain.      PAST MEDICAL HISTORY:     1.  Diabetes.   2.  Hypertension.      PAST SURGICAL HISTORY:  Reviewed and noncontributory.        FAMILY HISTORY:  Reviewed and noncontributory.      SOCIAL HISTORY:  The patient does not use tobacco or alcohol.  She is here with her 2 sons today.      REVIEW OF SYSTEMS:  Complete review of systems is unremarkable except as listed in history of present illness.      PHYSICAL EXAMINATION:   VITAL SIGNS:  The patient is afebrile with stable vital signs.  She is alert and oriented and in no acute distress.   HEENT:  Head is normocephalic and atraumatic.  Extraocular movements are intact.   NECK:  Supple and nontender.   LUNGS:  Breathing regular and nonlabored.   ABDOMEN:  Soft, nontender.   EXTREMITIES:  The right lower extremity:  She tolerates a log roll.  In fact, when I log roll through her knee, she notes the pain only in her knee.  There is no effusion about the knee.  No focal tenderness over the medial and lateral joint lines.   MUSCULOSKELETAL:  She does have tenderness over the greater trochanter with palpation.  She has  difficulty understanding what a straight leg raise is and is, therefore, not able to perform this, although she does readily perform heel slides with no increasing pain.  She is neurovascular intact distal.      IMAGING:  X-rays and MRI were reviewed.  The MRI shows a minimally-displaced greater trochanteric hip fracture.  The hip joint space is otherwise preserved and there is no femoral neck fracture.        DIAGNOSIS:  Right greater trochanteric hip fracture, minimally displaced.      PLAN:  This fracture should go on to heal well on its own with nonoperative management.  She will need to use a walker initially and then may eventually be able to transition more to a cane over the next 6 weeks.  At this pace, she will have some soreness and more limited mobility over the following 6 weeks.  She can be fully weightbearing as tolerated.  I would recommend that she works with physical therapy; and if she has significant limitations in mobility, she may benefit from a TCU stay.  She is welcome to follow up with my clinic in 3-4 weeks with new imaging to ensure no interval fracture or displacement and to evaluate her overall progress.         TIKA LOZANO MD             D: 2018   T: 2018   MT: AGATHA      Name:     KONG SIMEON   MRN:      [id number]        Account:       [id number]   :      1955           Consult Date:  2018      Document: [id number]       cc: [facility]

## 2018-04-09 NOTE — PLAN OF CARE
Problem: Patient Care Overview  Goal: Plan of Care/Patient Progress Review  PT eval completed and treatment initiated,  Pt admitted for fall with R greater trac fx, non surgical, WBAT.  Pt's previous level of function required min A from son for mob and ADL';s at home where he lives with her, 3 steps to enter with 1 rail. Pt previously amb with FWW.  Discharge Planner PT   Patient plan for discharge: per son who acts as pt , plan to take pt home  Current status: pt requires up to mod A for bed, min A sit to stand, amb with min to mod A, stairs x 5 with mod A from son and min A from PT, pt not following directions well for safety and sequencing  Barriers to return to prior living situation: stairs to enter, unsafe with stairs and amb, cont fall risk, pain limiting function  Recommendations for discharge: home with son and home PT  Rationale for recommendations: Anticipate pt will progress enough with cont PT to allow safe dc home with son, however recommend home PT for safety eval and cont PT to progress mob and safety at home       Entered by: LISSETT HATFIELD 04/09/2018 9:36 AM

## 2018-04-09 NOTE — PROGRESS NOTES
04/09/18 0800   Quick Adds   Type of Visit Initial PT Evaluation   Living Environment   Lives With child(addison), adult   Living Arrangements house   Home Accessibility stairs to enter home   Number of Stairs to Enter Home 3   Stair Railings at Home outside, present on right side   Transportation Available car;family or friend will provide   Self-Care   Regular Exercise no   Equipment Currently Used at Home walker, rolling   Activity/Exercise/Self-Care Comment Pt son, he helps with all ADL's and mob at home, present during eval, interpreting for pt.   Functional Level Prior   Ambulation 3-->assistive equipment and person  (amb with FWW with A)   Transferring 3-->assistive equipment and person   Toileting 2-->assistive person   Bathing 3-->assistive equipment and person   Dressing 2-->assistive person   Eating 2-->assistive person   Communication 0-->understands/communicates without difficulty   Swallowing 0-->swallows foods/liquids without difficulty   Fall history within last six months yes   Number of times patient has fallen within last six months 2   General Information   Onset of Illness/Injury or Date of Surgery - Date 04/06/18   Referring Physician Kateryna Lopez   Patient/Family Goals Statement PT and son plan on dc to home   Pertinent History of Current Problem (include personal factors and/or comorbidities that impact the POC) Pt admitted after fall with R greater troc fx, non surgical, WBAT   Precautions/Limitations fall precautions   Weight-Bearing Status - LUE full weight-bearing   Weight-Bearing Status - RUE full weight-bearing   Weight-Bearing Status - LLE full weight-bearing   Weight-Bearing Status - RLE weight-bearing as tolerated   Cognitive Status Examination   Orientation person  (per son pt not confused but having difficulty follow dirct)   Level of Consciousness confused;alert   Follows Commands and Answers Questions 50% of the time;able to follow single-step instructions   Personal Safety  "and Judgment impaired;at risk behaviors demonstrated;impulsive  (cont to use FWW improperly, lets go FWW, does not fol direct)   Memory impaired   Pain Assessment   Patient Currently in Pain Yes, see Vital Sign flowsheet  (5/10)   Range of Motion (ROM)   ROM Quick Adds No deficits were identified   Strength   Strength Comments decreased LE strength throughout, more impaired now on R due to pain, min A for sit to stand and mod A up and down stairs   Bed Mobility   Bed Mobility Comments mod to dep scoot in supine to position, min A supine to sit, mod A sit to supine   Transfer Skills   Transfer Comments min A sit to stand with cues for safety   Gait   Gait Comments amb with FWW with min A 10'   Balance   Balance Comments impaired standing static and dynamic balance requires B UE support and A   General Therapy Interventions   Planned Therapy Interventions bed mobility training;gait training;strengthening;transfer training   Clinical Impression   Criteria for Skilled Therapeutic Intervention yes, treatment indicated   PT Diagnosis difficulty with all mob, requiriing increased A from baseline   Influenced by the following impairments pain, decreased strength, balance, cog, language barrier   Functional limitations due to impairments impaired functional mob I and safety   Clinical Presentation Evolving/Changing   Clinical Presentation Rationale 3-5 deficits   Clinical Decision Making (Complexity) Moderate complexity   Therapy Frequency` 2 times/day   Predicted Duration of Therapy Intervention (days/wks) 3 days   Anticipated Discharge Disposition Home with Assist;Home with Home Therapy   Risk & Benefits of therapy have been explained Yes   Patient, Family & other staff in agreement with plan of care Yes   [facility] [employer] TM \"6 Clicks\"   2016, Trustees of [facility], under license to [employer].  All rights reserved.   6 Clicks Short Forms Basic Mobility Inpatient Short Form   [facility] [employer]-PAC  \"6 " "Clicks\" V.2 Basic Mobility Inpatient Short Form   1. Turning from your back to your side while in a flat bed without using bedrails? 3 - A Little   2. Moving from lying on your back to sitting on the side of a flat bed without using bedrails? 2 - A Lot   3. Moving to and from a bed to a chair (including a wheelchair)? 3 - A Little   4. Standing up from a chair using your arms (e.g., wheelchair, or bedside chair)? 3 - A Little   5. To walk in hospital room? 3 - A Little   6. Climbing 3-5 steps with a railing? 2 - A Lot   Basic Mobility Raw Score (Score out of 24.Lower scores equate to lower levels of function) 16   Total Evaluation Time   Total Evaluation Time (Minutes) 15     "

## 2018-04-09 NOTE — PLAN OF CARE
Problem: Patient Care Overview  Goal: Plan of Care/Patient Progress Review  Patient is Fijian speaker. Per son she is not confused. This nurse thinks she is confused based on patient's behavior. Sat on the edge of the bed with PT this morning. Incontinent of bladder. Had a large BM, incontinent. Poor appetite. Patient complains of pain 10/10. Patient is resistive to take pain pills, she wants IV dilaudid. Finally agreed to take tramadol. CMS intact.    Dr Lopez ordered pain consult

## 2018-04-09 NOTE — PLAN OF CARE
Problem: Patient Care Overview  Goal: Plan of Care/Patient Progress Review  Outcome: Improving  Pt is AAOx4, pain is managed, abdomen soft, passing gas. Weight shift assistance provided. Female staff to change briefs. Son interpreting per request. Resting comfortably throughout shift.

## 2018-04-09 NOTE — PLAN OF CARE
Problem: Fracture Orthopaedic (Adult)  Goal: Signs and Symptoms of Listed Potential Problems Will be Absent, Minimized or Managed (Fracture Orthopaedic)  Signs and symptoms of listed potential problems will be absent, minimized or managed by discharge/transition of care (reference Fracture Orthopaedic (Adult) CPG).   Outcome: Improving  Pt. VSS, up with one assist and walker, incontinent of urine, taking po dilaudid for pain , Will continue to monitor.

## 2018-04-09 NOTE — PLAN OF CARE
Problem: Patient Care Overview  Goal: Plan of Care/Patient Progress Review  Discharge Planner OT   Patient plan for discharge: home  Current status: pt requires DUSTIN and max cues for tech without pulling on therapist or family members for supine <> sit and to scoot up toward HOB. Sit <> stand and ambulated with ww to BR and back with MIN A and max cues for ww safety/distance and completed toilet transfer with BSC over toilet with DUSTIN/CGA and cues for tech. Pt able to complete toileting hygiene with SBA seated. Pt and pt's sons educated in DME resource for RTS with arms or BSC and shower chair and wants a cushiony seat and thinks that [facility] will cover DME, pt educated that most likely will not be covered and to call [facility] and/or talk with [facility] regarding insurance and coverage. Pt reports 9/10 hip pain and on first attempt unable to ambulate due to pain sitting unsafely and 2nd attempt CGA/Dustin with DUSTIN/CGA and cues for ww safety throughout mobility, pt's son interpreting.   Barriers to return to prior living situation: increased level of A with ADL's and functional mobility, pain, decreased balance and safety and strength.   Recommendations for discharge: TCU  Rationale for recommendations: recommend TCU for daily therapy to increase ADL and functional mobility independence and safety to PLOF, however, pt declining and wants to return home if home recommend 24 hr S and A with all ADL/IADL's and functional mobility due to risk for falls and decreased safety, home OT for home safety eval, ADL's and cognition as able and home RN for med mgmt.       Entered by: Karey Cooper 04/09/2018 3:54 PM

## 2018-04-10 ENCOUNTER — APPOINTMENT (OUTPATIENT)
Dept: OCCUPATIONAL THERAPY | Facility: CLINIC | Age: 63
DRG: 536 | End: 2018-04-10
Payer: COMMERCIAL

## 2018-04-10 ENCOUNTER — APPOINTMENT (OUTPATIENT)
Dept: PHYSICAL THERAPY | Facility: CLINIC | Age: 63
DRG: 536 | End: 2018-04-10
Payer: COMMERCIAL

## 2018-04-10 VITALS
TEMPERATURE: 98.5 F | OXYGEN SATURATION: 94 % | HEIGHT: 62 IN | RESPIRATION RATE: 16 BRPM | WEIGHT: 130.5 LBS | BODY MASS INDEX: 24.01 KG/M2 | DIASTOLIC BLOOD PRESSURE: 65 MMHG | HEART RATE: 66 BPM | SYSTOLIC BLOOD PRESSURE: 132 MMHG

## 2018-04-10 LAB
ERYTHROCYTE [DISTWIDTH] IN BLOOD BY AUTOMATED COUNT: 12.3 % (ref 10–15)
GLUCOSE BLDC GLUCOMTR-MCNC: 211 MG/DL (ref 70–99)
GLUCOSE BLDC GLUCOMTR-MCNC: 240 MG/DL (ref 70–99)
GLUCOSE BLDC GLUCOMTR-MCNC: 284 MG/DL (ref 70–99)
HCT VFR BLD AUTO: 32.1 % (ref 35–47)
HGB BLD-MCNC: 10.5 G/DL (ref 11.7–15.7)
MCH RBC QN AUTO: 30.3 PG (ref 26.5–33)
MCHC RBC AUTO-ENTMCNC: 32.7 G/DL (ref 31.5–36.5)
MCV RBC AUTO: 93 FL (ref 78–100)
PLATELET # BLD AUTO: 208 10E9/L (ref 150–450)
RBC # BLD AUTO: 3.46 10E12/L (ref 3.8–5.2)
WBC # BLD AUTO: 11.4 10E9/L (ref 4–11)

## 2018-04-10 PROCEDURE — 25000132 ZZH RX MED GY IP 250 OP 250 PS 637: Performed by: INTERNAL MEDICINE

## 2018-04-10 PROCEDURE — 00000146 ZZHCL STATISTIC GLUCOSE BY METER IP

## 2018-04-10 PROCEDURE — 36415 COLL VENOUS BLD VENIPUNCTURE: CPT | Performed by: INTERNAL MEDICINE

## 2018-04-10 PROCEDURE — 97530 THERAPEUTIC ACTIVITIES: CPT | Mod: GP | Performed by: PHYSICAL THERAPIST

## 2018-04-10 PROCEDURE — 40000133 ZZH STATISTIC OT WARD VISIT: Performed by: OCCUPATIONAL THERAPY ASSISTANT

## 2018-04-10 PROCEDURE — 97116 GAIT TRAINING THERAPY: CPT | Mod: GP | Performed by: PHYSICAL THERAPIST

## 2018-04-10 PROCEDURE — 97535 SELF CARE MNGMENT TRAINING: CPT | Mod: GO | Performed by: OCCUPATIONAL THERAPY ASSISTANT

## 2018-04-10 PROCEDURE — 40000193 ZZH STATISTIC PT WARD VISIT: Performed by: PHYSICAL THERAPIST

## 2018-04-10 PROCEDURE — 99239 HOSP IP/OBS DSCHRG MGMT >30: CPT | Performed by: INTERNAL MEDICINE

## 2018-04-10 PROCEDURE — 25000128 H RX IP 250 OP 636: Performed by: PHYSICIAN ASSISTANT

## 2018-04-10 PROCEDURE — 85027 COMPLETE CBC AUTOMATED: CPT | Performed by: INTERNAL MEDICINE

## 2018-04-10 RX ORDER — TRAMADOL HYDROCHLORIDE 100 MG/1
100 TABLET, EXTENDED RELEASE ORAL DAILY
Qty: 14 TABLET | Refills: 0 | Status: SHIPPED | OUTPATIENT
Start: 2018-04-11

## 2018-04-10 RX ORDER — OXYCODONE AND ACETAMINOPHEN 5; 325 MG/1; MG/1
1 TABLET ORAL EVERY 4 HOURS PRN
Qty: 14 TABLET | Refills: 0 | Status: SHIPPED | OUTPATIENT
Start: 2018-04-10

## 2018-04-10 RX ADMIN — HYDRALAZINE HYDROCHLORIDE 20 MG: 20 INJECTION INTRAMUSCULAR; INTRAVENOUS at 01:37

## 2018-04-10 RX ADMIN — OXYCODONE HYDROCHLORIDE AND ACETAMINOPHEN 1 TABLET: 5; 325 TABLET ORAL at 02:48

## 2018-04-10 RX ADMIN — GABAPENTIN 300 MG: 300 CAPSULE ORAL at 08:23

## 2018-04-10 RX ADMIN — TRAMADOL HYDROCHLORIDE 100 MG: 100 TABLET, EXTENDED RELEASE ORAL at 08:23

## 2018-04-10 RX ADMIN — OXYCODONE HYDROCHLORIDE AND ACETAMINOPHEN 1 TABLET: 5; 325 TABLET ORAL at 10:04

## 2018-04-10 RX ADMIN — LOSARTAN POTASSIUM AND HYDROCHLOROTHIAZIDE 1 TABLET: 50; 12.5 TABLET, FILM COATED ORAL at 08:23

## 2018-04-10 RX ADMIN — INSULIN ASPART 3 UNITS: 100 INJECTION, SOLUTION INTRAVENOUS; SUBCUTANEOUS at 12:34

## 2018-04-10 RX ADMIN — INSULIN ASPART 2 UNITS: 100 INJECTION, SOLUTION INTRAVENOUS; SUBCUTANEOUS at 08:24

## 2018-04-10 RX ADMIN — VITAMIN D, TAB 1000IU (100/BT) 2000 UNITS: 25 TAB at 08:23

## 2018-04-10 RX ADMIN — HYDROXYZINE HYDROCHLORIDE 10 MG: 10 TABLET ORAL at 02:48

## 2018-04-10 NOTE — PLAN OF CARE
Problem: Fracture Orthopaedic (Adult)  Goal: Signs and Symptoms of Listed Potential Problems Will be Absent, Minimized or Managed (Fracture Orthopaedic)  Signs and symptoms of listed potential problems will be absent, minimized or managed by discharge/transition of care (reference Fracture Orthopaedic (Adult) CPG).   Outcome: No Change  A&O X4. CMS intact. Pt states that pain is improved today, but still c/o of moderate-severe pain. Pain consult team saw pt and gave recommendations, see MAR for changes. Pt and family were requesting pt receive a cortisone injection for pain, informed surgeon of pt request and states this is not appropriate for pt to have. A-1 with walker. Eating, drinking, and voiding adequately. BM x1 this shift. VSS on RA.

## 2018-04-10 NOTE — DISCHARGE SUMMARY
[facility]    Discharge Summary  Hospitalist    Date of Admission:  4/6/2018  Date of Discharge:  4/10/2018  2:16 PM  Discharging Provider: Steffen Walters DO    Discharge Diagnoses   1. Right greater trochanteric fracture  2. Delirium   3. DM type II   4. Chronic anemia, likely JEZ  5. HTN   6. HLP     History of Present Illness   Vamshi Murry is an 63 year old female who presented with pain in the right hip and upper right leg that started 2-3 days prior to admission and had become more severe.  She felt it when she was weight bearing. Things have reached the point that she can not ambulate as normal with her walker. On presentation she denied having any pain in her left hip or leg. The pain in her right hip was partially relieved with narcotic medication in the ED. She denied fever, chills, sweats, weight loss, nausea, diarrhea, dysuria, upper back or chest pain, abdominal pain, headache, sore throat or any other acute complaints.    Hospital Course   Vamshi Murry was admitted on 4/6/2018.  The following problems were addressed during her hospitalization:    On MRI the patient had a mildly displaced oblique fracture of the right greater trochanteric.  Orthopedic surgery was consulted and recommended no operative plans and recommended weight bearing as tolerated.  PT/OT evaluated the patient and felt she could go home with home therapies. There was initially some difficulty with pain control while here and pain management was consulted.  We were able to get good control with PRN Tramadol and Percocet and she was sent home with this.      # Discharge Pain Plan:   - During her hospitalization, Vamshi experienced pain due to greater trochanteric fracture.  The pain plan for discharge was discussed with Vamshi and the plan was created in a collaborative fashion.    - Opioids prescribed on discharge: Percocet and Tramadol  - Duration of opioids after discharge: Given 14 tablets of Percocet  and Tramadol each  - Bowel regimen: not needed    Steffen Walters, DO    Significant Results and Procedures   See below    Pending Results   These results will be followed up by PCP  Unresulted Labs Ordered in the Past 30 Days of this Admission     Date and Time Order Name Status Description    4/7/2018 0510 Blood culture Preliminary     4/7/2018 0510 Blood culture Preliminary           Code Status   Full Code       Primary Care Physician   [facility]    Physical Exam   Temp: 98.5  F (36.9  C) Temp src: Oral BP: 132/65 Pulse: 66 Heart Rate: 61 Resp: 16 SpO2: 94 % O2 Device: None (Room air)    Vitals:    04/07/18 0435 04/08/18 0406 04/09/18 0700   Weight: 59.6 kg (131 lb 6.3 oz) 59.6 kg (131 lb 6.4 oz) 59.2 kg (130 lb 8 oz)     Vital Signs with Ranges  Temp:  [97.8  F (36.6  C)-98.5  F (36.9  C)] 98.5  F (36.9  C)  Pulse:  [66-83] 66  Heart Rate:  [61-83] 61  Resp:  [16-18] 16  BP: (130-178)/(60-89) 132/65  SpO2:  [94 %-98 %] 94 %  I/O last 3 completed shifts:  In: 1370 [P.O.:1370]  Out: -     Constitutional: Resting comfortably in bed.  NAD  Eyes: EOMI  Respiratory: Clear to auscultation.  No respiratory distress  Cardiovascular: RRR.  No murmurs  GI: Bowel sounds present  Musculoskeletal:  No edema     Discharge Disposition   Discharged to home  Condition at discharge: Stable    Consultations This Hospital Stay   CARE COORDINATOR IP CONSULT  SOCIAL WORK IP CONSULT  ORTHOPEDICS IP CONSULT  PHYSICAL THERAPY ADULT IP CONSULT  OCCUPATIONAL THERAPY ADULT IP CONSULT  PAIN MANAGEMENT ADULT IP CONSULT  PHYSICAL THERAPY ADULT IP CONSULT  SOCIAL WORK IP CONSULT  PAIN MANAGEMENT ADULT IP CONSULT    Time Spent on this Encounter   I, Steffen Walters, personally saw the patient today and spent greater than 30 minutes discharging this patient.    Discharge Orders     Home Care PT Referral for Hospital Discharge     Home Care OT Referral for Hospital Discharge     Reason for your hospital stay   Right trochanteric fracture      Follow-up and recommended labs and tests    Follow up with primary care provider, Rosendo Scott, within 2 weeks, for hospital follow- up. No follow up labs or test are needed.    Should also follow-up with orthopedic surgery in 3-4 weeks     Activity   Your activity upon discharge: activity as tolerated with walker.  Advance as recommended by PT     MD face to face encounter   Documentation of Face to Face and Certification for Home Health Services    I certify that patient: Vamshi Murry is under my care and that I, or a nurse practitioner or physician's assistant working with me, had a face-to-face encounter that meets the physician face-to-face encounter requirements with this patient on: 4/10/2018.    This encounter with the patient was in whole, or in part, for the following medical condition, which is the primary reason for home health care: Right trochanteric fracture.    I certify that, based on my findings, the following services are medically necessary home health services: Occupational Therapy and Physical Therapy.    My clinical findings support the need for the above services because: Occupational Therapy Services are needed to assess and treat cognitive ability and address ADL safety due to impairment in mobility due to trochanteric fracture. and Physical Therapy Services are needed to assess and treat the following functional impairments: impairment in mobility due to trochanteric fracture.    Further, I certify that my clinical findings support that this patient is homebound (i.e. absences from home require considerable and taxing effort and are for medical reasons or Gn[employer] services or infrequently or of short duration when for other reasons) because: Requires assistance of another person or specialized equipment to access medical services because patient:  Trochanteric fracture..    Based on the above findings. I certify that this patient is confined to the home and needs intermittent  skilled nursing care, physical therapy and/or speech therapy.  The patient is under my care, and I have initiated the establishment of the plan of care.  This patient will be followed by a physician who will periodically review the plan of care.  Physician/Provider to provide follow up care: Rosendo Scott    Attending [facility] physician (the [facility]): Steffen Walters  Physician Signature: See electronic signature associated with these discharge orders.  Date: 4/10/2018     Full Code     Diet   Follow this diet upon discharge: Orders Placed This Encounter     Combination Diet Regular Diet Adult; 2816-2275 Calories: Moderate Consistent CHO (4-6 CHO units/meal)       Discharge Medications   Current Discharge Medication List      START taking these medications    Details   oxyCODONE-acetaminophen (PERCOCET) 5-325 MG per tablet Take 1 tablet by mouth every 4 hours as needed for moderate to severe pain  Qty: 14 tablet, Refills: 0    Comments: Future refills by PCP Dr. Rosendo Scott with phone number [phone].  Associated Diagnoses: Closed fracture of trochanter of right femur, initial encounter (H)      traMADol (ULTRAM-ER) 100 MG 24 hr tablet Take 1 tablet (100 mg) by mouth daily  Qty: 14 tablet, Refills: 0    Comments: Future refills by PCP Dr. Rosendo Scott with phone number [phone].  Associated Diagnoses: Closed fracture of trochanter of right femur, initial encounter (H)      order for DME Equipment being ordered: Walker ()  Treatment Diagnosis: Trochanteric fracture  Qty: 1 Device, Refills: 0    Associated Diagnoses: Closed fracture of trochanter of right femur, initial encounter (H)         CONTINUE these medications which have NOT CHANGED    Details   losartan-hydrochlorothiazide (HYZAAR) 50-12.5 MG per tablet Take 1 tablet by mouth daily      Atorvastatin Calcium (LIPITOR PO) Take 40 mg by mouth At Bedtime      Ezetimibe (ZETIA PO) Take 10 mg by mouth At Bedtime       ketotifen (ZADITOR/REFRESH ANTI-ITCH) 0.025 % SOLN ophthalmic solution Place 1 drop into both eyes daily as needed for itching      Polyethylene Glycol 3350 (MIRALAX PO) Take 17 g by mouth daily as needed      GABAPENTIN PO Take 300 mg by mouth 2 times daily       VITAMIN D, CHOLECALCIFEROL, PO Take 2,000 Units by mouth daily       METFORMIN HCL PO Take 500 mg by mouth 2 times daily (with meals)       GLIMEPIRIDE PO Take 2 mg by mouth daily (with breakfast)            Allergies   No Known Allergies  Data   Most Recent 3 CBC's:  Recent Labs   Lab Test  04/10/18   0715  04/09/18   0625  04/08/18   0805   WBC  11.4*  12.2*  14.6*   HGB  10.5*  10.4*  10.8*   MCV  93  94  93   PLT  208  297  296      Most Recent 3 BMP's:  Recent Labs   Lab Test  04/09/18   0625  04/08/18   0805  04/07/18   1036   NA  136  135  132*   POTASSIUM  3.6  3.7  3.9   CHLORIDE  102  100  98   CO2  27  28  28   BUN  10  12  19   CR  0.62  0.64  0.78   ANIONGAP  7  7  6   FARIBA  9.2  9.4  9.7   GLC  203*  205*  219*     Most Recent 2 LFT's:  Recent Labs   Lab Test  04/07/18   0635   AST  15   ALT  16   ALKPHOS  45   BILITOTAL  1.0     Most Recent INR's and Anticoagulation Dosing History:  Anticoagulation Dose History     There is no flowsheet data to display.        Most Recent 3 Troponin's:No lab results found.  Most Recent Cholesterol Panel:No lab results found.  Most Recent 6 Bacteria Isolates From Any Culture (See EPIC Reports for Culture Details):  Recent Labs   Lab Test  04/07/18   0640  04/07/18   0635  04/07/18   0020   CULT  No growth after 3 days  No growth after 3 days  10,000 to 50,000 colonies/mL  mixed urogenital bin    Susceptibility testing not routinely done     Most Recent TSH, T4 and A1c Labs:  Recent Labs   Lab Test  04/07/18   0635   A1C  7.1*     Results for orders placed or performed during the hospital encounter of 04/06/18   XR Lumbar Spine 2/3 Views    Narrative    LUMBAR SPINE 3 VIEWS  4/6/2018 11:02 PM     HISTORY:  Low back pain and right-sided leg pain.    COMPARISON: None.      Impression    IMPRESSION:  1. No visualized acute fracture, malalignment or other acute osseous  abnormality of the lumbar spine.  2. Mild to moderate degenerative changes within the lumbar spine.  3. Atherosclerotic calcification in the abdominal aorta.    YAW HUDDLESTON MD   XR Pelvis w Hip Right 1 View    Narrative    PELVIS AND RIGHT HIP 2 VIEWS  4/6/2018 11:02 PM     HISTORY: Pain.    COMPARISON: None.      Impression    IMPRESSION:  1. No visualized acute fracture, malalignment or other acute osseous  abnormality of the pelvis or right hip.  2. Diffuse osteopenia.    YAW HUDDLESTON MD   CT Abdomen Pelvis w Contrast    Narrative    CT ABDOMEN AND PELVIS WITH CONTRAST  4/6/2018 11:50 PM     HISTORY: Lower abdominal pain and right hip and leg pain.    COMPARISON: None.    TECHNIQUE: Following the uneventful administration of 71 mL Isovue-370  intravenous contrast, helical sections were acquired from the top of  the diaphragm through the pubic symphysis. Coronal reconstructions  were generated. Radiation dose for this scan was reduced using  automated exposure control, adjustment of the mA and/or kV according  to the patient's size, or iterative reconstruction technique.    FINDINGS:  Abdomen: The liver, spleen, pancreas, adrenal glands and kidneys are  unremarkable. The gallbladder is present. No enlarged lymph nodes or  free fluid in the upper abdomen. Atherosclerotic calcification in the  abdominal aorta.    Scan through the lower chest is significant for coronary artery  calcification.    Pelvis: The small and large bowel are normal in caliber. A few colonic  diverticula are present without evidence of diverticulitis. The  appendix is unremarkable. No bowel wall thickening, pneumatosis or  free intraperitoneal gas. The uterus is not visualized. No enlarged  lymph nodes or free fluid in the pelvis. Unremarkable CT appearance of  the pelvis and  hips. Note that evaluation of the inferior pelvis is  mildly limited due to motion artifact.      Impression    IMPRESSION:  1. No cause of acute pain identified in the abdomen or pelvis.  2. Colonic diverticulosis without evidence of diverticulitis.    YAW HUDDLESTON MD   US Lower Extremity Venous Duplex Bilateral    Narrative    VENOUS ULTRASOUND BOTH LEGS  4/7/2018 9:56 AM     HISTORY: fever of unknown origin;     COMPARISON: None.    FINDINGS: Examination of the deep veins with graded compression and  color flow Doppler with spectral wave form analysis shows  no evidence  of thrombus in the common femoral vein, femoral vein, popliteal vein  or calf veins..      Impression    IMPRESSION: No evidence of deep venous thrombosis.    MICHAEL BREWER MD   US Upper Extremity Venous Duplex Bilat    Narrative    ULTRASOUND BILATERAL UPPER EXTREMITY VENOUS DUPLEX  -  4/7/2018 9:56  AM     HISTORY:  Fever of unknown origin. .     COMPARISON: None.    FINDINGS: Ultrasound of both upper extremities was performed. There is  no evidence of thrombus in the internal jugular, subclavian, axillary,  cephalic, basilic, or brachial veins bilaterally.      Impression    IMPRESSION: No evidence of deep venous thrombosis.     MICHAEL BREWER MD   XR Chest 2 Views    Narrative    CHEST TWO VIEWS  4/7/2018 9:14 AM     COMPARISON: None.    HISTORY: Fever and chest pain.      Impression    IMPRESSION: There is questionable subtle airspace opacity in the lower  lungs on both sides that may represent atelectasis or pneumonia. The  lungs are otherwise clear. There is no pleural effusion or  pneumothorax. Heart size is normal with no evidence for congestive  failure.    JOHNSON STYLES MD   CT Chest w/o Contrast    Narrative    CT CHEST WITHOUT CONTRAST   4/7/2018 11:43 AM     HISTORY: Low grade fever, rule out bilateral pneumonia given abnormal  chest XR, malignancy also in the differential.     TECHNIQUE:  No IV contrast material. Radiation  dose for this scan was  reduced using automated exposure control, adjustment of the mA and/or  kV according to patient size, or iterative reconstruction technique.    COMPARISON: None.    FINDINGS:  There is moderate atelectasis in both lung bases. There are  patchy areas of opacity in the lower lobes which are likely due to  atelectasis, but it would be difficult to exclude an acute infectious  process. There is no pleural effusion. There is coronary arterial and  aortic calcification. Visualized portions of the upper abdominal  organs show no acute abnormality. Contrast in the renal collecting  systems from recent abdominal CT.      Impression    IMPRESSION: Bilateral lower lobe pulmonary opacities, favored to  represent atelectasis. It is difficult to completely exclude an acute  infectious process.    MICHAEL BREWER MD   MR Femur Right w/o & w Contrast     Value    Radiologist flags Mildly displaced obliquely oriented fracture right (AA)    Narrative    MR FEMUR RIGHT WITHOUT AND WITH CONTRAST 4/7/2018 12:48 PM    HISTORY: History of lytic lesion in the right femur now presenting  with leg pain, fever, leukocytosis.      COMPARISON: X-ray from 4/6/2018.    TECHNIQUE: Routine multiplanar, multisequence imaging was performed. 6  mL Gadavist    FINDINGS: Oblique nondisplaced fracture right greater trochanter.  There is surrounding edema and fluid some of which may be hemorrhage.  This is primarily in the gluteus medius muscle which in large part  inserts on the fractured greater trochanteric fragment. No other  fractures. No other abnormalities identified.      Impression    IMPRESSION: Mildly displaced transverse to obliquely oriented fracture  of the right greater trochanter. No evidence of femoral neck fracture  or other fractures. There is surrounding edema and fluid/blood. No  evidence of abscess.    [Critical Result: Mildly displaced obliquely oriented fracture right  greater trochanter.]    Finding was  identified on 4/7/2018 4:28 PM.     The patient's nurse in the observation unit was contacted by me on  4/7/2018 4:39 PM and verbalized understanding of the critical result.     FIGUEROA ZAMAN MD

## 2018-04-10 NOTE — PROGRESS NOTES
Spoke to patient and son at bedside.  They wished to use their [facility] [employer] company thinking they do skilled PT services, but found they do not.  They initially were ok with [facility], but have decided that they want to talk to Dr. Blunt, the PCP, before agreeing to an agency or if it is needed.  I reiterated that it would be best to choose an agency while she is still at the hospital as we would fax the information to the agency.  The son understands that they would then need orders from Dr. Blunt, [facility] updated.  Notation in discharge instructions.   Information faxed to [facility] as requested.  Will follow. Mag Taylor RN

## 2018-04-10 NOTE — PLAN OF CARE
Problem: Patient Care Overview  Goal: Plan of Care/Patient Progress Review  Discharge Planner OT   Patient plan for discharge: home  Current status: Pt Marce with cues to use bed rail vs pulling up on therapist hand with completing supine to sit EOB, Marce sit to stand, amb slowly with FWW CGA to/from bathroom, toilet transfer with RTS Marce, assist needed for clothing management and pericares.  Pt family will be assisting pt as needed for all ADLS and transfers.   Barriers to return to prior living situation: level of A with ADL's and functional mobility, pain, decreased balance and safety and strength.   Recommendations for discharge: TCU per plan established by the Occupational THerapist however pt will be discharging to home with family 24/7 assist for all ADLS and transfers.   Rationale for recommendations: Family will be assisting pt as needed for ADLS       Entered by: Juliet Montalvo 04/10/2018 8:05 AM       Pt to d/c to home with family assist, GOALS NOT MET, see discharge summary

## 2018-04-10 NOTE — PLAN OF CARE
Problem: Patient Care Overview  Goal: Plan of Care/Patient Progress Review  Outcome: Improving  Pt A/O x4. [place] speaking, family at bedside to assist w/ interpretation. Up w/ 1A, GB walker. Incontinent of B&B. Tolerating Mod Carb diet.  and 240. C/o pain in R. Hip, Lidocaine patch applied to R. Hip. Pain also controlled w/ Percocet and Atarax. VSS ex elevated Bp. PRN hydralazine given for increased SBP. Possible d/c home today. Will continue to monitor.

## 2018-04-10 NOTE — PLAN OF CARE
Problem: Fracture Orthopaedic (Adult)  Goal: Signs and Symptoms of Listed Potential Problems Will be Absent, Minimized or Managed (Fracture Orthopaedic)  Signs and symptoms of listed potential problems will be absent, minimized or managed by discharge/transition of care (reference Fracture Orthopaedic (Adult) CPG).   VSS, up with one assist and walker, incontinent of urine, taking percocet for pain, d/c instruction reviewed with pt's son,  d/c medications given to the son and pt. D/c to home with son at 1416.

## 2018-04-10 NOTE — DISCHARGE INSTRUCTIONS
Patient and son wish to consult with PCP before having HHC, they are aware that they will need to get HHC orders from Dr. Blunt, PCP.

## 2018-04-10 NOTE — PLAN OF CARE
Problem: Patient Care Overview  Goal: Plan of Care/Patient Progress Review    Discharge Planner PT   Patient plan for discharge: per son who acts as pt , plan to take pt home. Pt and pt's son agreeable to home PT.  Current status: Pt in supine upon arrival of therapist. Pt performed supine-sit, Marce. Sit <> stand and ambulation of 20 feet and 15 feet with FWW and CGA. Pt navigated 4 stairs with 1 rail and Marce FELICIANO, noted pt had difficulty following cues for appropriate sequencing. Pt agreeable to sit up in chair at end of session.  Barriers to return to prior living situation: stairs to enter, level of assist  Recommendations for discharge: home with assist from son for all mobility with use of FWW and home PT  Rationale for recommendations: Pt has demonstrated ability to complete mobility with assist of 1, patient's son present for session and reports he will be able to assist as needed.       Entered by: Yesenia Lancaster 04/10/2018 12:23 PM       Physical Therapy Discharge Summary    Reason for therapy discharge:    Discharged to home with home therapy.    Progress towards therapy goal(s). See goals on Care Plan in [facility] electronic health record for goal details.  Goals met    Therapy recommendation(s):    Continued therapy is recommended.  Rationale/Recommendations:  Pt will benefit from further skilled PT intervention with Home PT in order to increase independence and safety with functional mobilty.

## 2018-04-10 NOTE — PROGRESS NOTES
D: Consult acknowledged. Reviewed medical record. Per PT and MD, the recommendation is home with home PT. SW to consult with CC regarding home PT. No other SW needs are apparent at this time.

## 2018-04-13 LAB
BACTERIA SPEC CULT: NO GROWTH
BACTERIA SPEC CULT: NO GROWTH
Lab: NORMAL
Lab: NORMAL
SPECIMEN SOURCE: NORMAL
SPECIMEN SOURCE: NORMAL

## 2020-01-29 ENCOUNTER — APPOINTMENT (OUTPATIENT)
Dept: GENERAL RADIOLOGY | Facility: CLINIC | Age: 65
End: 2020-01-29
Attending: NURSE PRACTITIONER
Payer: COMMERCIAL

## 2020-01-29 ENCOUNTER — HOSPITAL ENCOUNTER (EMERGENCY)
Facility: CLINIC | Age: 65
Discharge: HOME | End: 2020-01-29
Attending: NURSE PRACTITIONER | Admitting: NURSE PRACTITIONER
Payer: COMMERCIAL

## 2020-01-29 VITALS
TEMPERATURE: 98.4 F | DIASTOLIC BLOOD PRESSURE: 68 MMHG | RESPIRATION RATE: 18 BRPM | OXYGEN SATURATION: 96 % | SYSTOLIC BLOOD PRESSURE: 132 MMHG | HEART RATE: 96 BPM

## 2020-01-29 DIAGNOSIS — M79.641 PAIN OF RIGHT HAND: ICD-10-CM
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
## 2020-01-29 PROCEDURE — 73130 X-RAY EXAM OF HAND: CPT | Mod: RT

## 2020-01-29 PROCEDURE — 99283 EMERGENCY DEPT VISIT LOW MDM: CPT

## 2020-01-29 PROCEDURE — 25000132 ZZH RX MED GY IP 250 OP 250 PS 637: Performed by: NURSE PRACTITIONER

## 2020-01-29 RX ORDER — IBUPROFEN 600 MG/1
600 TABLET, FILM COATED ORAL ONCE
Status: COMPLETED | OUTPATIENT
Start: 2020-01-29 | End: 2020-01-29

## 2020-01-29 RX ADMIN — IBUPROFEN 600 MG: 600 TABLET ORAL at 14:17

## 2020-01-29 ASSESSMENT — ENCOUNTER SYMPTOMS
WEAKNESS: 0
COLOR CHANGE: 0
WOUND: 0
NUMBNESS: 0

## 2020-01-29 NOTE — ED AVS SNAPSHOT
Emergency Department  [address] [facility] [address]  Phone:  [phone]  Fax:  [phone]                                    Vamshi Murry   MRN: [id number]    Department:   Emergency Department   Date of Visit:  1/29/2020           After Visit Summary Signature Page    I have received my discharge instructions, and my questions have been answered. I have discussed any challenges I see with this plan with the nurse or doctor.    ..........................................................................................................................................  Patient/Patient Representative Signature      ..........................................................................................................................................  Patient Representative Print Name and Relationship to Patient    ..................................................               ................................................  Date                                   Time    ..........................................................................................................................................  Reviewed by Signature/Title    ...................................................              ..............................................  Date                                               Time          22EPIC Rev 08/18

## 2020-01-29 NOTE — ED PROVIDER NOTES
History     Chief Complaint:  Finger Injury           Vamshi Murry is a 65 year old female who presents to the emergency department for evaluation of finger injury on her right hand. The patient fell after slipping on the ice 3 days ago. She fell on the back of her hand and has had pain in the hand just below the thumb since. The patient denies hitting her head or other concerns.    Allergies:  No known drug allergies     Medications:    Lipitor   Zetia  Gabapentin  Glimepiride  Zaditor  Hyzaar  Metformin  Percocet  Miralax  Ultram    Past Medical History:    Back pain  Leg pain  Diabetes  Dyslipidemia  Fibromyalgia  Hyperparathyroidism  Hypertension    Past Surgical History:    Hysterectomy    Family History:    No past pertinent family history.    Social History:  The patient presents today with her son and daughter-in-law.  Never smoker  Negative for alcohol use.  Negative for drug use.  Marital Status:      Review of Systems   Musculoskeletal:        Hand pain near base of thumb   Skin: Negative for color change and wound.   Neurological: Negative for weakness and numbness.   All other systems reviewed and are negative.      Physical Exam     Patient Vitals for the past 24 hrs:   BP Temp Temp src Pulse Resp SpO2   01/29/20 1413 132/68 98.4  F (36.9  C) Oral 96 18 96 %     Physical Exam  General: Alert, No obvious discomfort, well kept   HENT:  Normal voice, No lymphadenopathy  Eyes:  The pupils are equal, round, and reactive to light, Conjunctiva normal, No scleral icterus   Neck:  Normal range of motion  CV:  Normal Pulses  Resp:  Non-labored, No cough  MS:  Normal muscular tone, moves all extremities, mild tenderness and swelling base of right thumb.  No deformity or crepitus.  No significant color change.  Able to extend thumb and flex thumb without obvious difficulty.  There is some pain with the extremes of flexing thumb.  Normal distal sensation and circulation.  Skin:  No rash or acute skin  lesions noted  Neuro: Speech is normal and fluent  Psych:  Awake. Alert.  Normal affect.  Appropriate interactions. Good eye contact    Emergency Department Course     Imaging:  Radiology findings were communicated with the patient who voiced understanding of the findings.    XR Hand Right G/E 3 Views:  No fracture identified.  As per radiology.    Interventions:  1417 Ibuprofen 600 mg PO    Emergency Department Course:    1411 Nursing notes and vitals reviewed.    1412 I performed an exam of the patient as documented above.     1422 The patient was sent for a XR Hand Right G/E 3 Views while in the emergency department, results above.     1446 Patient rechecked and updated.     Findings and plan explained to the Patient. Patient discharged home with instructions regarding supportive care, medications, and reasons to return. The importance of close follow-up was reviewed.     Impression & Plan     Medical Decision Making:  Vamshi Murry is a 65 year old female who presents to the emergency department today with concern for hand injury after fall 3 days ago.  She slipped on the ice smacking the back of her hand as she fell.  She is continued to have discomfort since that time therefore family presented with concern for bony injury.  Her examination shows normal tendon function with some mild swelling consistent with contusion type injury.  X-ray was obtained without indication for fracture, malalignment, or dislocation.  Exam is consistent with contusion type injury.  She is advised on appropriate NSAID use and ice or heat.  Follow-up with primary care provider or orthopedics in 7+ days if no improvement sooner if worsening.  She appears to be safe and appropriate for outpatient management follow-up and is discharged home.    Discharge Diagnosis:    ICD-10-CM    1. Fall, initial encounter W19.XXXA    2. Pain of right hand M79.641      Disposition:  The patient is discharged to home.     Scribe Disclosure:  I  Jeff Daigle, am serving as a scribe at 2:17 PM on 1/29/2020 to document services personally performed by Lauri Cruz APRN based on my observations and the provider's statements to me.      Lauri Cruz APRN CNP  01/29/20 1455

## 2020-01-29 NOTE — DISCHARGE INSTRUCTIONS
Ibuprofen or Naproxen and/or Tylenol scheduled for the next 3-5 days. 600 mg (three tabs) ibuprofen, 440 mg (two tabs) Naproxen, 650-1000 mg of Tylenol.  Ibuprofen and Tylenol are every 6 hours Naproxen is 2 times daily. Follow directions.   Ice or heat to area.  I recommend ice in the first 24-48 hours.

## 2020-12-08 ENCOUNTER — TRANSFERRED RECORDS (OUTPATIENT)
Dept: HEALTH INFORMATION MANAGEMENT | Facility: CLINIC | Age: 65
End: 2020-12-08

## 2020-12-08 ENCOUNTER — MEDICAL CORRESPONDENCE (OUTPATIENT)
Dept: HEALTH INFORMATION MANAGEMENT | Facility: CLINIC | Age: 65
End: 2020-12-08

## 2020-12-10 ENCOUNTER — TRANSCRIBE ORDERS (OUTPATIENT)
Dept: OTHER | Age: 65
End: 2020-12-10

## 2020-12-10 DIAGNOSIS — Z86.59 HISTORY OF DEMENTIA: Primary | ICD-10-CM

## 2020-12-15 ENCOUNTER — DOCUMENTATION ONLY (OUTPATIENT)
Dept: CARE COORDINATION | Facility: CLINIC | Age: 65
End: 2020-12-15

## 2021-01-05 ENCOUNTER — PRE VISIT (OUTPATIENT)
Dept: NEUROLOGY | Facility: CLINIC | Age: 66
End: 2021-01-05

## 2021-01-05 NOTE — TELEPHONE ENCOUNTER
FUTURE VISIT INFORMATION      FUTURE VISIT INFORMATION:    Date: 1/7/2021    Time: 3pm    Location: [facility]  REFERRAL INFORMATION:    Referring provider:  Dr. Rowley    Referring providers clinic:  [facility]     Reason for visit/diagnosis  Dementia    RECORDS REQUESTED FROM:       Clinic name Comments Records Status Imaging Status   [facility]  Scanned to Chart N/A         [facility]-2/24/2020 Care Everywhere Requested to PACS                       1/5/2021-Request for Images faxed to [facility] @ 957am    1/7/2021-[facility] Images now in PACS-[facility] @ 609am

## 2021-02-02 ENCOUNTER — ANCILLARY PROCEDURE (OUTPATIENT)
Dept: BONE DENSITY | Facility: CLINIC | Age: 66
End: 2021-02-02
Payer: COMMERCIAL

## 2021-02-02 DIAGNOSIS — Z98.890 S/P PARATHYROIDECTOMY: ICD-10-CM

## 2021-02-02 DIAGNOSIS — Z90.89 S/P PARATHYROIDECTOMY: ICD-10-CM

## 2021-02-02 DIAGNOSIS — M81.0 OSTEOPOROSIS: ICD-10-CM

## 2021-02-02 PROCEDURE — 77080 DXA BONE DENSITY AXIAL: CPT | Performed by: INTERNAL MEDICINE
